# Patient Record
Sex: MALE | Race: WHITE | NOT HISPANIC OR LATINO | Employment: STUDENT | URBAN - METROPOLITAN AREA
[De-identification: names, ages, dates, MRNs, and addresses within clinical notes are randomized per-mention and may not be internally consistent; named-entity substitution may affect disease eponyms.]

---

## 2022-06-08 ENCOUNTER — OFFICE VISIT (OUTPATIENT)
Dept: URGENT CARE | Facility: URGENT CARE | Age: 27
End: 2022-06-08
Payer: COMMERCIAL

## 2022-06-08 VITALS
HEART RATE: 82 BPM | RESPIRATION RATE: 18 BRPM | OXYGEN SATURATION: 98 % | DIASTOLIC BLOOD PRESSURE: 80 MMHG | WEIGHT: 315 LBS | TEMPERATURE: 97.9 F | SYSTOLIC BLOOD PRESSURE: 120 MMHG

## 2022-06-08 DIAGNOSIS — R05.9 COUGH IN ADULT: ICD-10-CM

## 2022-06-08 DIAGNOSIS — J01.10 ACUTE NON-RECURRENT FRONTAL SINUSITIS: Primary | ICD-10-CM

## 2022-06-08 LAB — SARS-COV-2 RNA RESP QL NAA+PROBE: NEGATIVE

## 2022-06-08 PROCEDURE — 99203 OFFICE O/P NEW LOW 30 MIN: CPT | Performed by: FAMILY MEDICINE

## 2022-06-08 PROCEDURE — U0003 INFECTIOUS AGENT DETECTION BY NUCLEIC ACID (DNA OR RNA); SEVERE ACUTE RESPIRATORY SYNDROME CORONAVIRUS 2 (SARS-COV-2) (CORONAVIRUS DISEASE [COVID-19]), AMPLIFIED PROBE TECHNIQUE, MAKING USE OF HIGH THROUGHPUT TECHNOLOGIES AS DESCRIBED BY CMS-2020-01-R: HCPCS | Performed by: FAMILY MEDICINE

## 2022-06-08 PROCEDURE — U0005 INFEC AGEN DETEC AMPLI PROBE: HCPCS | Performed by: FAMILY MEDICINE

## 2022-06-08 RX ORDER — AMOXICILLIN 500 MG/1
1000 CAPSULE ORAL 2 TIMES DAILY
Qty: 40 CAPSULE | Refills: 0 | Status: SHIPPED | OUTPATIENT
Start: 2022-06-08 | End: 2022-06-18

## 2022-06-08 NOTE — PATIENT INSTRUCTIONS
Amoxicillin twice daily for 10 days.      Continue Tylenol or ibuprofen as needed for headache pain.    If not any better after 2 weeks, follow up with primary care.

## 2022-06-08 NOTE — PROGRESS NOTES
ICD-10-CM    1. Acute non-recurrent frontal sinusitis  J01.10 amoxicillin (AMOXIL) 500 MG capsule   2. Cough in adult  R05.9 Symptomatic; Yes; 5/25/2022 COVID-19 Virus (Coronavirus) by PCR Nose     PLAN:  Discussed with patient that length of symptoms is right on the cusp of when antibiotics may be helpful for a sinus infection but that this may still be viral, which antibiotics will not help.    Patient Instructions   Amoxicillin twice daily for 10 days.      Continue Tylenol or ibuprofen as needed for headache pain.    If not any better after 2 weeks, follow up with primary care.    SUBJECTIVE:  Edwin Alex is a 26 year old male who presents to  today with about 2 weeks of ongoing congestion and cough that seems to be worsening.  He's having more consistent frontal headaches as well.  No fever.  Has been using OTC analgesics sporadically to help with the headaches.  No sore throat.    Has septoplasty scheduled on 6/15.    OBJECTIVE:  /80 (BP Location: Right arm, Patient Position: Chair, Cuff Size: Adult Large)   Pulse 82   Temp 97.9  F (36.6  C) (Oral)   Resp 18   Wt (!) 156.5 kg (345 lb)   SpO2 98%   GEN: well-appearing, in NAD  ENT: TMs normal, oral MMM, R lower turbinate swollen, L turbinates appear normal, oral MMM, no pharyngeal erythema, no exudates  Neck: no LAD

## 2022-06-12 PROCEDURE — U0003 INFECTIOUS AGENT DETECTION BY NUCLEIC ACID (DNA OR RNA); SEVERE ACUTE RESPIRATORY SYNDROME CORONAVIRUS 2 (SARS-COV-2) (CORONAVIRUS DISEASE [COVID-19]), AMPLIFIED PROBE TECHNIQUE, MAKING USE OF HIGH THROUGHPUT TECHNOLOGIES AS DESCRIBED BY CMS-2020-01-R: HCPCS | Performed by: CLINICAL MEDICAL LABORATORY

## 2022-06-12 PROCEDURE — PSEU10635 2019 NOVEL CORONAVIRUS (SARS-COV-2): Performed by: CLINICAL MEDICAL LABORATORY

## 2022-06-12 PROCEDURE — U0005 INFEC AGEN DETEC AMPLI PROBE: HCPCS | Performed by: CLINICAL MEDICAL LABORATORY

## 2022-06-13 ENCOUNTER — LAB REQUISITION (OUTPATIENT)
Dept: LAB | Age: 27
End: 2022-06-13

## 2022-06-13 ENCOUNTER — TELEPHONE (OUTPATIENT)
Dept: GASTROENTEROLOGY | Facility: CLINIC | Age: 27
End: 2022-06-13
Payer: COMMERCIAL

## 2022-06-13 DIAGNOSIS — J34.2 DEVIATED NASAL SEPTUM: ICD-10-CM

## 2022-06-13 LAB
SARS-COV-2 RNA RESP QL NAA+PROBE: NOT DETECTED
SERVICE CMNT-IMP: NORMAL
SERVICE CMNT-IMP: NORMAL

## 2022-06-13 NOTE — TELEPHONE ENCOUNTER
LVM to reschdule patient with Dr. Rodrigez for an earlier appointment. Dr. Rodrigez would like to see him much sooner then 10/10. Please schedule early appts in held slots

## 2022-06-20 NOTE — TELEPHONE ENCOUNTER
REFERRAL INFORMATION:    Referring Provider: Self    Referring Clinic:    Reason for Visit/Diagnosis: difficulty swallowing     FUTURE VISIT INFORMATION:    Appointment Date: 7.5.22    Appointment Time: 8 AM     NOTES STATUS DETAILS   OFFICE NOTE from Referring Provider N/A    OFFICE NOTE from Other Specialist N/A    HOSPITAL DISCHARGE SUMMARY/  ED VISITS N/A    OPERATIVE REPORT N/A    MEDICATION LIST N/A         ENDOSCOPY  N/A    COLONOSCOPY N/A    ERCP N/A    EUS N/A    STOOL TESTING N/A    PERTINENT LABS N/A    PATHOLOGY REPORTS (RELATED) N/A    IMAGING (CT, MRI, EGD, MRCP, Small Bowel Follow Through/SBT, MR/CT Enterography) N/A

## 2022-07-05 ENCOUNTER — VIRTUAL VISIT (OUTPATIENT)
Dept: GASTROENTEROLOGY | Facility: CLINIC | Age: 27
End: 2022-07-05
Payer: COMMERCIAL

## 2022-07-05 ENCOUNTER — PRE VISIT (OUTPATIENT)
Dept: GASTROENTEROLOGY | Facility: CLINIC | Age: 27
End: 2022-07-05
Payer: COMMERCIAL

## 2022-07-05 VITALS — WEIGHT: 315 LBS

## 2022-07-05 DIAGNOSIS — R11.10 REGURGITATION OF FOOD: ICD-10-CM

## 2022-07-05 DIAGNOSIS — R07.89 NON-CARDIAC CHEST PAIN: ICD-10-CM

## 2022-07-05 DIAGNOSIS — R13.19 ESOPHAGEAL DYSPHAGIA: Primary | ICD-10-CM

## 2022-07-05 PROCEDURE — 99205 OFFICE O/P NEW HI 60 MIN: CPT | Mod: GT | Performed by: INTERNAL MEDICINE

## 2022-07-05 ASSESSMENT — PAIN SCALES - GENERAL: PAINLEVEL: NO PAIN (0)

## 2022-07-05 NOTE — PATIENT INSTRUCTIONS
It was a pleasure taking care of you today.  I've included a brief summary of our discussion and care plan from today's visit below.  Please review this information with your primary care provider.  _______________________________________________________________________    My recommendations are summarized as follows:    Please schedule an upper endoscopy to evaluate your symptoms.   Below are possible treatments for eosinophilic esophagitis if this is confirmed:  Eosinophilic Esophagitis Treatment options include:   - Proton Pump Inhibitors (PPI) twice daily  - Fluticasone 440 to 880mcg twice daily  - Budesonide 1 to 2mg twice daily mixed with (Each 1mg mixed with 10 packets of splenda to create a slurry)  - Six food elimination diet: Avoid wheat, milk, egg, tree nuts/peanuts, seafood, and soy  - Following swallowed steroid do not eat or drink anything for at least 30 minutes  - Dupilumab is an injectable drug that was recently approved for eosinophilic esophagitis      To schedule endoscopic procedures you may call: [phone]  To schedule radiology tests you may call: [phone]  To schedule an ENT appointment you may call: [phone]    Please call my nurse Flora ([phone]), Best ([phone]) with any questions or concerns.  If you were seen through the [facility] please feel free to reach out to Crystal at [phone]   --    Return to GI Clinic in 3 months to review your progress.    _______________________________________________________________________    Who do I call with any questions after my visit?  Please be in touch if there are any further questions that arise following today's visit.  There are multiple ways to contact your gastroenterology care team.      During business hours, you may reach a Gastroenterology nurse at [phone] and choose option 3.       To schedule or reschedule an appointment, please call [phone].     You can always send a secure message through  MyChart.  [employer] messages are answered by your nurse or doctor typically within 24 hours.  Please allow extra time on weekends and holidays.      For urgent/emergent questions after business hours, you may reach the on-call GI Fellow by contacting the [facility]  at [phone].     How will I get the results of any tests ordered?    You will receive all of your results.  If you have signed up for [employer], any tests ordered at your visit will be available to you after your physician reviews them.  Typically this takes 1-2 weeks.  If there are urgent results that require a change in your care plan, your physician or nurse will call you to discuss the next steps.      What is [employer]?  [employer] is a secure way for you to access all of your healthcare records from the [facility].  It is a web based computer program, so you can sign on to it from any location.  It also allows you to send secure messages to your care team.  I recommend signing up for [employer] access if you have not already done so and are comfortable with using a computer.      How to I schedule a follow-up visit?  If you did not schedule a follow-up visit today, please call [phone] to schedule a follow-up office visit.      If you feel you received exceptional care and are interested in supporting the clinical and research goals of Dr. Rodrigez or the Division of Gastroenterology, Hepatology, and Nutrition please contact [email or web address] from the [facility] to discuss opportunities to donate.    Sincerely,    Juan Pablo Rodrigez DO   of Medicine  Director, Esophageal Disorders Program  Division of Gastroenterology, Hepatology, and Nutrition  [facility]

## 2022-07-05 NOTE — LETTER
7/5/2022         RE: Edwin Alex  [address] [place] [address]        Dear Colleague,    Thank you for referring your patient, Edwin Alex, to the [facility] [place]. Please see a copy of my visit note below.    Edwin is a 26 year old who is being evaluated via a billable video visit.     Patient denies any changes since echeck-in regarding medication and allergies and states all information entered during echeck-in remains accurate.     How would you like to obtain your AVS? MyChart  If the video visit is dropped, the invitation should be resent by: Send to e-mail at: [email or web address]  Will anyone else be joining your video visit? No       Yaritza Marcelino      Video-Visit Details    Video Start Time: 7:55AM    Type of service:  Video Visit    Video End Time: 8:42 AM    Originating Location (pt. Location): home    Distant Location (provider location):  [facility] [place]     Platform used for Video Visit: Comply7    Gastroenterology Visit for: Edwin Alex 1995   MRN: [id number]     Reason for Visit:  Dysphagia     Referred by: Self   Patient Care Team:  No Ref-Primary, Physician as PCP - Juan Pablo Johns DO as MD (Gastroenterology)    History of Present Illness:   Edwin Alex is a 26 year old male with prior history of chronic sinusitis and deviated septum s/p septoplasty 6/15/22 who is presenting as a new patient in consultation at the request of Dr. Maylin Mcintyre from [facility] in [place] with a chief complaint of reflux and dysphagia with regurgitation.  ---------------------------------------------------------------  Edwin Alex states ongoing issues with dysphagia for the past couple of years with progression of symptoms over the last year. Dysphagia is limited to solid foods such as meats and bread where he feels as though it gets stuck in his chest. Typically lasts 5-7 minutes before  resolving. Occasionally associated with retrosternal pain. Will sometimes attempt to drink fluid to wash it down which can help but has lead to regurgitation with choking and coughing as well. Denies any issues with liquids or soft foods on their own. He also notes a long standing history of acid reflux and occasionally experiences nasal regurgitation. He takes TUMS which helps somewhat but has never tried PPI therapy. Denies any weight loss, voice changes, odynophagia, nausea, vomiting, early satiety, abdominal pain, abdominal distension/bloating, diarrhea, constipation, hematochezia, or melena. No history of radiation exposure. No family history of EOE, esophageal cancer, autoimmune diseases. Denies any history of seasonal allergies or food allergies. Has never had EGD, esophagram or manometry testing previously. Patient does not smoke. He drinks 2-3 alcoholic beverages per week. Uses Zyn pouches but no chewing tobacco products.    ---------------------------------------------------------------      Family history of colon cancer: none  Wt Readings from Last 5 Encounters:   07/05/22 (!) 156.5 kg (345 lb)   06/08/22 (!) 156.5 kg (345 lb)        Esophageal Questionnaire(s)    BEDQ Questionnaire  BEDQ Questionnaire: How Often Have You Had the Following? 7/5/2022   Trouble eating solid food (meat, bread, vegetables) 4   Trouble eating soft foods (yogurt, jello, pudding) 0   Trouble swallowing liquids 1   Pain while swallowing 3   Coughing or choking while swallowing foods or liquids 1   Total Score: 9     BEDQ Questionnaire: Discomfort/Pain Ratings 7/5/2022   Eating solid food (meat, bread, vegetables) 4   Eating soft foods (yogurt, jello, pudding) 1   Drinking liquid 0   Total Score: 5       Eckardt Questionnaire  Eckardt Questionnaire 7/5/2022   Dysphagia 2   Regurgitation 1   Retrosternal Pain 2   Weight Loss (kg) 0   Total Score:  5       Promis 10 Questionnaire  No flowsheet data found.      STUDIES &  PROCEDURES:    EGD:   Date:  Impression:  Pathology Report:    Colonoscopy:  Date:  Impression:  Pathology Report:     EndoFLIP directed at the UES or LES (8cm (EF-325) balloon length or 16cm (EF-322) balloon length):   Date:  8cm balloon  Balloon inflation Balloon pressure CSA (mm^2) DI (mm^2/mmHg) Dmin (mm) Compliance   20 (ladmark ID)        30        40        50           16cm balloon  Balloon inflation Balloon pressure CSA (mm^2) DI (mm^2/mmHg) Dmin (mm) Compliance   30 (ladmark ID)        40        50        60        70           High Resolution Manometry:  Date:  Impression:    PH/Impedance:  Date:  Impression:     Bravo:  48 or 96hr  Date:  Impression:    CT:  Date:  Impression:    Esophagram:  Date:  Impression:     Prior medical records were reviewed including, but not limited to, notes from referring providers, lab work, radiographic tests, and other diagnostic tests. Pertinent results were summarized above.     History   No past medical history on file.    No past surgical history on file.    Social History     Socioeconomic History     Marital status: Single     Spouse name: Not on file     Number of children: Not on file     Years of education: Not on file     Highest education level: Not on file   Occupational History     Not on file   Tobacco Use     Smoking status: Never Smoker     Smokeless tobacco: Current User     Types: Chew   Substance and Sexual Activity     Alcohol use: Yes     Drug use: Never     Sexual activity: Not Currently   Other Topics Concern     Not on file   Social History Narrative     Not on file     Social Determinants of Health     Financial Resource Strain: Not on file   Food Insecurity: Not on file   Transportation Needs: Not on file   Physical Activity: Not on file   Stress: Not on file   Social Connections: Not on file   Intimate Partner Violence: Not on file   Housing Stability: Not on file     Patient does not smoke. He drinks 2-3 alcoholic beverages per week. Uses Zyn  pouches but no tobacco products. He just finished a masters program in sports management at [employer] and is currently looking for jobs.     No family history on file.  Family history reviewed and edited as appropriate    Medications and Allergies:     No outpatient encounter medications on file as of 7/5/2022.     No facility-administered encounter medications on file as of 7/5/2022.        No Known Allergies     Review of systems:  A full 10 point review of systems was obtained and was negative except for the pertinent positives and negatives stated within the HPI.    Objective Findings:   Physical Exam:    Constitutional: Wt (!) 156.5 kg (345 lb)   General: Alert, cooperative, no distress, well-appearing  Head: Atraumatic, normocephalic, no obvious abnormalities   Eyes: EOMI, Sclera anicteric, no obvious conjunctival hemorrhage   Nose:  no obvious malformation, no obvious rhinorrhea   Respiratory: breathing comfortably on room air, not using accessory muscles   Cardiovascular: deferred   Gastrointestinal:  non-distended abdomen   Musculoskeletal: Range of motion intact, no obvious strength deficit  Skin: No jaundice, no obvious rash  Neurologic: AAOx3, no obvious neurologic abnormality  Psychiatric: Normal Affect, appropriate mood  Extremities: not assessed      Labs, Radiology, Pathology     No results found for: WBC, HGB, PLT, CHOL, TRIG, HDL, LDLDIRECT, ALT, AST, NA, CREATININE, BUN, CO2, TSH, INR, GLUF     Liver Function Studies - No results for input(s): PROTTOTAL, ALBUMIN, BILITOTAL, ALKPHOS, AST, ALT, BILIDIRECT in the last 29014 hours.       Patient Active Problem List    Diagnosis Date Noted     Esophageal dysphagia 07/05/2022     Priority: Medium     Regurgitation of food 07/05/2022     Priority: Medium     Non-cardiac chest pain 07/05/2022     Priority: Medium      Assessment and Plan   Assessment:    Edwin Alex is a 26 year old male with prior history of chronic sinusitis and deviated septum s/p  septoplasty 6/15/22 who is presenting as a new patient with a chief complaint of reflux and dysphagia with regurgitation.    Reports progressive, what primarily sounds like esophageal dysphagia with associated acid reflux and intermittent regurgitation for the last two years with worsening over the last year. No associated red flag symptoms. No prior investigation such as EGD, esophagram or manometry. Ddx includes reflux esophagitis w or w/o stricturing in the setting of untreated GERD vs EOE in this young, otherwise healthy male patient. Cannot entirely rule out other etiologies such as hiatal hernia, schatzki ring among others at this time. Malignancy low on differential given age, duration of symptoms, no family history. Will evaluate further with EGD and biopsies. Therapy recommendations pending EGD evaluation.    Plan:  - Order placed for endoscopic evaluation with possible balloon dilation if needed   - If confirmed EOE, would proceed with PPI BID vs swallowed steroidal therapy (Budesonide or Fluticasone) vs Dupixent and/or elimination diet   - Discussed lifestyle modifications for GERD; weight loss, low acid diet, meal time planning   - F/U in 3 months       Follow up plan:   Return to clinic 3 months and as needed.    The risks and benefits of my recommendations, as well as other treatment options were discussed with the patient and any available family today. All questions were answered.     o Follow up: As planned above. Today, I personally spent 40 minutes in direct face to face time with the patient, of which greater than 50% of the time was spent in patient education and counseling as described above. Approximately 15 minutes were spent on indirect care associated with the patient's consultation including but not limited to review of: patient medical records to date, clinic visits, hospital records, lab results, imaging studies, procedural documentation, and coordinating care with other providers. The  findings from this review are summarized in the above note. All of the above accounted for a cumulative time of 55 minutes and was performed on the date of service.     The patient verbalized understanding of the plan and was appreciative for the time spent and information provided during the office visit.     Pramod Lozoya MD  Internal Medicine PGY3    Discussed with staff Dr Elia MD       Documentation assisted by voice recognition and documentation system.    Attending Attestation:  I saw the patient with Dr. Lozoya and agree with the findings and the plan of care as documented in the resident's note. In summary, the patient is an 26 year old male with a history of dysphagia. We have been consulted to assess the patient's dysphagia and regurgitation     The patient was seen in video telemedicine consultation regarding symptoms of dysphagia and regurgitation as well as retrosternal pain.  Given his age and symptoms the highest diagnosis on my differential is eosinophilic esophagitis.  That being said, he may have some component of reflux disease.  It is certainly possible that he has extensive erosive esophagitis and/or a peptic stricture that is contributing to his symptoms.  It seems less likely that he has a motility abnormality given the absence of dysphagia to liquids however this is a possibility as well.  Least likely is malignancy given his age and the duration of symptoms.    In order to evaluate his symptoms I have asked that he undergo upper endoscopy with biopsies for eosinophilic esophagitis.  He may require dilation at that time.  I have ordered to endoscopies to be performed approximately 2 to 4 weeks apart.  If he does not require dilation the second endoscopy can be canceled.    I discussed the pathophysiology of eosinophilic esophagitis as well as treatment options for the disorder.  Future therapy directions will be based on the upper endoscopy.  Overall time spent discussing, thinking,  reviewing the chart including available imaging and labs, and evaluating the patient was 55 minutes of which greater than 50% of the time was spent on counseling and coordination of care.    Juan Pablo Rodrigez DO   of Medicine  Director, Esophageal Disorders Program  Division of Gastroenterology, Hepatology, and Nutrition  [facility]          Again, thank you for allowing me to participate in the care of your patient.        Sincerely,        Juan Pablo Rodrigez DO

## 2022-07-05 NOTE — PROGRESS NOTES
Edwin is a 26 year old who is being evaluated via a billable video visit.     Patient denies any changes since echeck-in regarding medication and allergies and states all information entered during echeck-in remains accurate.     How would you like to obtain your AVS? [email or web address]  If the video visit is dropped, the invitation should be resent by: Send to e-mail at: [email or web address]  Will anyone else be joining your video visit? No       Yaritza Owenseldon      Video-Visit Details    Video Start Time: 7:55AM    Type of service:  Video Visit    Video End Time: 8:42 AM    Originating Location (pt. Location): home    Distant Location (provider location):  [facility] [place]     Platform used for Video Visit: [employer]    Gastroenterology Visit for: Edwin Alex 1995   MRN: [id number]     Reason for Visit:  Dysphagia     Referred by: Self   Patient Care Team:  No Ref-Primary, Physician as PCP - Juan Pablo Johns DO as MD (Gastroenterology)    History of Present Illness:   Edwin Alex is a 26 year old male with prior history of chronic sinusitis and deviated septum s/p septoplasty 6/15/22 who is presenting as a new patient in consultation at the request of Dr. Maylin Mcintyre from [facility] in [place] with a chief complaint of reflux and dysphagia with regurgitation.  ---------------------------------------------------------------  Edwin Alex states ongoing issues with dysphagia for the past couple of years with progression of symptoms over the last year. Dysphagia is limited to solid foods such as meats and bread where he feels as though it gets stuck in his chest. Typically lasts 5-7 minutes before resolving. Occasionally associated with retrosternal pain. Will sometimes attempt to drink fluid to wash it down which can help but has lead to regurgitation with choking and coughing as well. Denies any issues with liquids or soft foods on their own. He also notes a  long standing history of acid reflux and occasionally experiences nasal regurgitation. He takes TUMS which helps somewhat but has never tried PPI therapy. Denies any weight loss, voice changes, odynophagia, nausea, vomiting, early satiety, abdominal pain, abdominal distension/bloating, diarrhea, constipation, hematochezia, or melena. No history of radiation exposure. No family history of EOE, esophageal cancer, autoimmune diseases. Denies any history of seasonal allergies or food allergies. Has never had EGD, esophagram or manometry testing previously. Patient does not smoke. He drinks 2-3 alcoholic beverages per week. Uses Zyn pouches but no chewing tobacco products.    ---------------------------------------------------------------      Family history of colon cancer: none  Wt Readings from Last 5 Encounters:   07/05/22 (!) 156.5 kg (345 lb)   06/08/22 (!) 156.5 kg (345 lb)        Esophageal Questionnaire(s)    BEDQ Questionnaire  BEDQ Questionnaire: How Often Have You Had the Following? 7/5/2022   Trouble eating solid food (meat, bread, vegetables) 4   Trouble eating soft foods (yogurt, jello, pudding) 0   Trouble swallowing liquids 1   Pain while swallowing 3   Coughing or choking while swallowing foods or liquids 1   Total Score: 9     BEDQ Questionnaire: Discomfort/Pain Ratings 7/5/2022   Eating solid food (meat, bread, vegetables) 4   Eating soft foods (yogurt, jello, pudding) 1   Drinking liquid 0   Total Score: 5       Eckardt Questionnaire  Eckardt Questionnaire 7/5/2022   Dysphagia 2   Regurgitation 1   Retrosternal Pain 2   Weight Loss (kg) 0   Total Score:  5       Promis 10 Questionnaire  No flowsheet data found.      STUDIES & PROCEDURES:    EGD:   Date:  Impression:  Pathology Report:    Colonoscopy:  Date:  Impression:  Pathology Report:     EndoFLIP directed at the UES or LES (8cm (EF-325) balloon length or 16cm (EF-322) balloon length):   Date:  8cm balloon  Balloon inflation Balloon pressure CSA  (mm^2) DI (mm^2/mmHg) Dmin (mm) Compliance   20 (ladmark ID)        30        40        50           16cm balloon  Balloon inflation Balloon pressure CSA (mm^2) DI (mm^2/mmHg) Dmin (mm) Compliance   30 (ladmark ID)        40        50        60        70           High Resolution Manometry:  Date:  Impression:    PH/Impedance:  Date:  Impression:     Bravo:  48 or 96hr  Date:  Impression:    CT:  Date:  Impression:    Esophagram:  Date:  Impression:     Prior medical records were reviewed including, but not limited to, notes from referring providers, lab work, radiographic tests, and other diagnostic tests. Pertinent results were summarized above.     History   No past medical history on file.    No past surgical history on file.    Social History     Socioeconomic History     Marital status: Single     Spouse name: Not on file     Number of children: Not on file     Years of education: Not on file     Highest education level: Not on file   Occupational History     Not on file   Tobacco Use     Smoking status: Never Smoker     Smokeless tobacco: Current User     Types: Chew   Substance and Sexual Activity     Alcohol use: Yes     Drug use: Never     Sexual activity: Not Currently   Other Topics Concern     Not on file   Social History Narrative     Not on file     Social Determinants of Health     Financial Resource Strain: Not on file   Food Insecurity: Not on file   Transportation Needs: Not on file   Physical Activity: Not on file   Stress: Not on file   Social Connections: Not on file   Intimate Partner Violence: Not on file   Housing Stability: Not on file     Patient does not smoke. He drinks 2-3 alcoholic beverages per week. Uses Zyn pouches but no tobacco products. He just finished a masters program in sports management at [employer] and is currently looking for jobs.     No family history on file.  Family history reviewed and edited as appropriate    Medications and Allergies:     No outpatient encounter  medications on file as of 7/5/2022.     No facility-administered encounter medications on file as of 7/5/2022.        No Known Allergies     Review of systems:  A full 10 point review of systems was obtained and was negative except for the pertinent positives and negatives stated within the HPI.    Objective Findings:   Physical Exam:    Constitutional: Wt (!) 156.5 kg (345 lb)   General: Alert, cooperative, no distress, well-appearing  Head: Atraumatic, normocephalic, no obvious abnormalities   Eyes: EOMI, Sclera anicteric, no obvious conjunctival hemorrhage   Nose:  no obvious malformation, no obvious rhinorrhea   Respiratory: breathing comfortably on room air, not using accessory muscles   Cardiovascular: deferred   Gastrointestinal:  non-distended abdomen   Musculoskeletal: Range of motion intact, no obvious strength deficit  Skin: No jaundice, no obvious rash  Neurologic: AAOx3, no obvious neurologic abnormality  Psychiatric: Normal Affect, appropriate mood  Extremities: not assessed      Labs, Radiology, Pathology     No results found for: WBC, HGB, PLT, CHOL, TRIG, HDL, LDLDIRECT, ALT, AST, NA, CREATININE, BUN, CO2, TSH, INR, GLUF     Liver Function Studies - No results for input(s): PROTTOTAL, ALBUMIN, BILITOTAL, ALKPHOS, AST, ALT, BILIDIRECT in the last [id number] hours.       Patient Active Problem List    Diagnosis Date Noted     Esophageal dysphagia 07/05/2022     Priority: Medium     Regurgitation of food 07/05/2022     Priority: Medium     Non-cardiac chest pain 07/05/2022     Priority: Medium      Assessment and Plan   Assessment:    Edwin Alex is a 26 year old male with prior history of chronic sinusitis and deviated septum s/p septoplasty 6/15/22 who is presenting as a new patient with a chief complaint of reflux and dysphagia with regurgitation.    Reports progressive, what primarily sounds like esophageal dysphagia with associated acid reflux and intermittent regurgitation for the last two years  with worsening over the last year. No associated red flag symptoms. No prior investigation such as EGD, esophagram or manometry. Ddx includes reflux esophagitis w or w/o stricturing in the setting of untreated GERD vs EOE in this young, otherwise healthy male patient. Cannot entirely rule out other etiologies such as hiatal hernia, schatzki ring among others at this time. Malignancy low on differential given age, duration of symptoms, no family history. Will evaluate further with EGD and biopsies. Therapy recommendations pending EGD evaluation.    Plan:  - Order placed for endoscopic evaluation with possible balloon dilation if needed   - If confirmed EOE, would proceed with PPI BID vs swallowed steroidal therapy (Budesonide or Fluticasone) vs Dupixent and/or elimination diet   - Discussed lifestyle modifications for GERD; weight loss, low acid diet, meal time planning   - F/U in 3 months       Follow up plan:   Return to clinic 3 months and as needed.    The risks and benefits of my recommendations, as well as other treatment options were discussed with the patient and any available family today. All questions were answered.     o Follow up: As planned above. Today, I personally spent 40 minutes in direct face to face time with the patient, of which greater than 50% of the time was spent in patient education and counseling as described above. Approximately 15 minutes were spent on indirect care associated with the patient's consultation including but not limited to review of: patient medical records to date, clinic visits, hospital records, lab results, imaging studies, procedural documentation, and coordinating care with other providers. The findings from this review are summarized in the above note. All of the above accounted for a cumulative time of 55 minutes and was performed on the date of service.     The patient verbalized understanding of the plan and was appreciative for the time spent and information  provided during the office visit.     Pramod Lozoya MD  Internal Medicine PGY3    Discussed with staff Dr Elia MD       Documentation assisted by voice recognition and documentation system.    Attending Attestation:  I saw the patient with Dr. Lozoya and agree with the findings and the plan of care as documented in the resident's note. In summary, the patient is an 26 year old male with a history of dysphagia. We have been consulted to assess the patient's dysphagia and regurgitation     The patient was seen in video telemedicine consultation regarding symptoms of dysphagia and regurgitation as well as retrosternal pain.  Given his age and symptoms the highest diagnosis on my differential is eosinophilic esophagitis.  That being said, he may have some component of reflux disease.  It is certainly possible that he has extensive erosive esophagitis and/or a peptic stricture that is contributing to his symptoms.  It seems less likely that he has a motility abnormality given the absence of dysphagia to liquids however this is a possibility as well.  Least likely is malignancy given his age and the duration of symptoms.    In order to evaluate his symptoms I have asked that he undergo upper endoscopy with biopsies for eosinophilic esophagitis.  He may require dilation at that time.  I have ordered to endoscopies to be performed approximately 2 to 4 weeks apart.  If he does not require dilation the second endoscopy can be canceled.    I discussed the pathophysiology of eosinophilic esophagitis as well as treatment options for the disorder.  Future therapy directions will be based on the upper endoscopy.  Overall time spent discussing, thinking, reviewing the chart including available imaging and labs, and evaluating the patient was 55 minutes of which greater than 50% of the time was spent on counseling and coordination of care.    Juan Pablo Rodrigez DO   of Medicine  Director, Esophageal Disorders  Program  Division of Gastroenterology, Hepatology, and Nutrition  [facility]

## 2022-07-05 NOTE — LETTER
Date:July 6, 2022      Provider requested that no letter be sent. Do not send.       [facility]

## 2022-07-07 ENCOUNTER — TELEPHONE (OUTPATIENT)
Dept: GASTROENTEROLOGY | Facility: CLINIC | Age: 27
End: 2022-07-07

## 2022-07-07 NOTE — TELEPHONE ENCOUNTER
Screening Questions    BlueKIND OF PREP RedLOCATION [review exclusion criteria] GreenSEDATION TYPE      1. Are you active on mychart? Yes    2. What insurance is in the chart? [facility]     3.   Ordering/Referring Provider: Elia    4. BMI   (If greater than 40 review exclusion criteria [PAC APPT IF [MAC] @ [facility])  42.0  [If yes, BMI OVER 40-EXTENDED PREP]      **(Sedation review/consideration needed)**  Do you have a legal guardian or Medical Power of    and/or are you able to give consent for your medical care?     Yes    5. Have you had a positive covid test in the last 90 days?   N - NA    6.  Are you currently on dialysis?   N [ If yes, G-PREP & HOSPITAL setting ONLY]     7.  Do you have chronic kidney disease?  N [ If yes, G-PREP ]    8.   Do you have a diagnosis of diabetes?   N   [ If yes, G-PREP ]    9.  On a regular basis do you go 3-5 days between bowel movements?   NA   [ If yes, EXTENDED PREP]    10.  Are you taking any prescription pain medications on a routine schedule?    N - NA [ If yes, EXTENDED PREP] [If yes, MAC]      11.   Do you have any chemical dependencies such as alcohol, street drugs, or methadone?    N [If yes, MAC]    12.   Do you have any history of post-traumatic stress syndrome, severe anxiety or history of psychosis?    N  [If yes, MAC]    13.  [FEMALES] Are you currently pregnant? NA    If yes, how many weeks?       Respiratory/Heart Screening:  [If yes to any of the following HOSPITAL setting only]     14. Do you have Pulmonary Hypertension [Lungs]?   N       15. Do you have UNCONTROLLED asthma?   N     16.  Do you use daily home oxygen?  N      17. Do you have mod to severe Obstructive Sleep Apnea?         (OKAY @ [facility]  [facility]  SH  PH  RI  MG - if pt is not on OXYGEN)  N      18.   Have you had a heart or lung transplant?   N      19.   Have you had a stroke or Transient ischemic attack (TIA - aka  mini stroke ) within 6 months?  (If yes, please review exclusion criteria)  N      20.   In the past 6 months, have you had any heart related issues including cardiomyopathy or heart attack?   N           If yes, did it require cardiac stenting or other implantable device?   N      21.   Do you have any implantable devices in your body (pacemaker, defib, LVAD)? (If yes, please review exclusion criteria)  N     22. Do you take nitroglycerin?   N           If yes, how often? NA  (if yes, HOSPITAL setting ONLY)    23.  Are you currently taking any blood thinners?    [If yes, INFORM patient to follw up w/ ORDERING PROVIDER FOR BRIDGING INSTRUCTIONS]     N    24.   Do you transfer independently?                (If NO, please HOSPITAL setting ONLY)  Yes    25.   Preferred LOCAL Pharmacy for Pre Prescription:           [id number] [place] [address]    Scheduling Details  (Please ask for phone number if not scheduled by patient)      Caller : Edwin  Date of Procedure: 8/4/22 and 9/22/22  Surgeon: Elia  [place] [facility] [place] Type: MAC l   Conscious Sedation- Needs  for 6 hours after the procedure  MAC/General-Needs  for 24 hours after procedure    NA :[Pre-op Required] at U  SH  MG and OR for MAC sedation   (advise patient they will need a pre-op WITH IN 30 DAYS of procedure date)     Type of Procedure Scheduled:   Upper Endoscopy [EGD]    Which Colonoscopy Prep was Sent?:   TIANNA -     ROSALIND CF PATIENTS & GROEN'S PATIENTS NEEDS EXTENDED PREP       Informed patient they will need an adult  Yes  Cannot take any type of public or medical transportation alone    Pre-Procedure Covid test to be completed at [facility] or Externally: HOME  **INFORMED OF HOME TESTING & LAB OPTION**        Confirmed Nurse will call to complete assessment Yes    Additional comments:

## 2022-07-24 ENCOUNTER — HEALTH MAINTENANCE LETTER (OUTPATIENT)
Age: 27
End: 2022-07-24

## 2022-08-01 ENCOUNTER — TELEPHONE (OUTPATIENT)
Dept: GASTROENTEROLOGY | Facility: CLINIC | Age: 27
End: 2022-08-01

## 2022-08-01 NOTE — TELEPHONE ENCOUNTER
Attempted to contact patient regarding upcoming EGD procedure on 8.4.22 for pre assessment questions. No answer.     Left message to return call to [phone] #3    Gila Schofield RN

## 2022-08-01 NOTE — TELEPHONE ENCOUNTER
Patient scheduled for EGD on 8.4.22.     Covid test scheduled ?. Discuss at home test option.     Arrival time: 0950    [place] [facility] [place]edation type: MAC    Indication for procedure: Dysphagia, possible EoE    Anticoagulations? No     Pre visit planning completed.    Gila Schofield RN

## 2022-08-01 NOTE — TELEPHONE ENCOUNTER
Patient returned call.     Pre assessment questions completed for upcoming EGD procedure scheduled on 8/4/22    COVID policy reviewed. Patient to complete rapid antigen test one to two days before their scheduled procedure. Patient to bring photo of the results when they come in for their procedure.    Reviewed procedural arrival time 0950 and facility location [facility].    Designated  policy reviewed. Instructed to have someone stay 24 hours post procedure.     Reviewed EGD prep instructions with patient. NPO six hours prior to procedure.     Patient verbalized understanding and had no questions or concerns at this time.    Marcy Hinton RN

## 2022-08-04 ENCOUNTER — ANESTHESIA EVENT (OUTPATIENT)
Dept: SURGERY | Facility: AMBULATORY SURGERY CENTER | Age: 27
End: 2022-08-04
Payer: COMMERCIAL

## 2022-08-04 ENCOUNTER — HOSPITAL ENCOUNTER (OUTPATIENT)
Facility: AMBULATORY SURGERY CENTER | Age: 27
Discharge: HOME | End: 2022-08-04
Attending: INTERNAL MEDICINE
Payer: COMMERCIAL

## 2022-08-04 ENCOUNTER — ANESTHESIA (OUTPATIENT)
Dept: SURGERY | Facility: AMBULATORY SURGERY CENTER | Age: 27
End: 2022-08-04
Payer: COMMERCIAL

## 2022-08-04 VITALS
HEART RATE: 60 BPM | RESPIRATION RATE: 16 BRPM | DIASTOLIC BLOOD PRESSURE: 67 MMHG | HEIGHT: 76 IN | WEIGHT: 315 LBS | TEMPERATURE: 97 F | OXYGEN SATURATION: 97 % | BODY MASS INDEX: 38.36 KG/M2 | SYSTOLIC BLOOD PRESSURE: 123 MMHG

## 2022-08-04 VITALS — HEART RATE: 80 BPM

## 2022-08-04 DIAGNOSIS — R13.19 ESOPHAGEAL DYSPHAGIA: Primary | ICD-10-CM

## 2022-08-04 DIAGNOSIS — K20.80 LOS ANGELES GRADE D ESOPHAGITIS: ICD-10-CM

## 2022-08-04 LAB — UPPER GI ENDOSCOPY: NORMAL

## 2022-08-04 PROCEDURE — 88305 TISSUE EXAM BY PATHOLOGIST: CPT | Mod: TC | Performed by: INTERNAL MEDICINE

## 2022-08-04 PROCEDURE — 43239 EGD BIOPSY SINGLE/MULTIPLE: CPT

## 2022-08-04 RX ORDER — FLUMAZENIL 0.1 MG/ML
0.2 INJECTION, SOLUTION INTRAVENOUS
Status: ACTIVE | OUTPATIENT
Start: 2022-08-04 | End: 2022-08-04

## 2022-08-04 RX ORDER — ONDANSETRON 2 MG/ML
4 INJECTION INTRAMUSCULAR; INTRAVENOUS
Status: DISCONTINUED | OUTPATIENT
Start: 2022-08-04 | End: 2022-08-04 | Stop reason: HOSPADM

## 2022-08-04 RX ORDER — ONDANSETRON 2 MG/ML
4 INJECTION INTRAMUSCULAR; INTRAVENOUS EVERY 6 HOURS PRN
Status: DISCONTINUED | OUTPATIENT
Start: 2022-08-04 | End: 2022-08-05 | Stop reason: HOSPADM

## 2022-08-04 RX ORDER — SODIUM CHLORIDE, SODIUM LACTATE, POTASSIUM CHLORIDE, CALCIUM CHLORIDE 600; 310; 30; 20 MG/100ML; MG/100ML; MG/100ML; MG/100ML
INJECTION, SOLUTION INTRAVENOUS CONTINUOUS PRN
Status: DISCONTINUED | OUTPATIENT
Start: 2022-08-04 | End: 2022-08-04

## 2022-08-04 RX ORDER — PROPOFOL 10 MG/ML
INJECTION, EMULSION INTRAVENOUS CONTINUOUS PRN
Status: DISCONTINUED | OUTPATIENT
Start: 2022-08-04 | End: 2022-08-04

## 2022-08-04 RX ORDER — ONDANSETRON 4 MG/1
4 TABLET, ORALLY DISINTEGRATING ORAL EVERY 6 HOURS PRN
Status: DISCONTINUED | OUTPATIENT
Start: 2022-08-04 | End: 2022-08-05 | Stop reason: HOSPADM

## 2022-08-04 RX ORDER — NALOXONE HYDROCHLORIDE 0.4 MG/ML
0.4 INJECTION, SOLUTION INTRAMUSCULAR; INTRAVENOUS; SUBCUTANEOUS
Status: DISCONTINUED | OUTPATIENT
Start: 2022-08-04 | End: 2022-08-05 | Stop reason: HOSPADM

## 2022-08-04 RX ORDER — NALOXONE HYDROCHLORIDE 0.4 MG/ML
0.2 INJECTION, SOLUTION INTRAMUSCULAR; INTRAVENOUS; SUBCUTANEOUS
Status: DISCONTINUED | OUTPATIENT
Start: 2022-08-04 | End: 2022-08-05 | Stop reason: HOSPADM

## 2022-08-04 RX ORDER — LIDOCAINE HYDROCHLORIDE 20 MG/ML
INJECTION, SOLUTION INFILTRATION; PERINEURAL PRN
Status: DISCONTINUED | OUTPATIENT
Start: 2022-08-04 | End: 2022-08-04

## 2022-08-04 RX ORDER — PROCHLORPERAZINE MALEATE 10 MG
10 TABLET ORAL EVERY 6 HOURS PRN
Status: DISCONTINUED | OUTPATIENT
Start: 2022-08-04 | End: 2022-08-05 | Stop reason: HOSPADM

## 2022-08-04 RX ORDER — OMEPRAZOLE 40 MG/1
40 CAPSULE, DELAYED RELEASE ORAL DAILY
Qty: 90 CAPSULE | Refills: 3 | Status: SHIPPED | OUTPATIENT
Start: 2022-08-04 | End: 2023-08-04

## 2022-08-04 RX ORDER — PROPOFOL 10 MG/ML
INJECTION, EMULSION INTRAVENOUS PRN
Status: DISCONTINUED | OUTPATIENT
Start: 2022-08-04 | End: 2022-08-04

## 2022-08-04 RX ORDER — LIDOCAINE 40 MG/G
CREAM TOPICAL
Status: DISCONTINUED | OUTPATIENT
Start: 2022-08-04 | End: 2022-08-04 | Stop reason: HOSPADM

## 2022-08-04 RX ADMIN — LIDOCAINE HYDROCHLORIDE 100 MG: 20 INJECTION, SOLUTION INFILTRATION; PERINEURAL at 11:17

## 2022-08-04 RX ADMIN — SODIUM CHLORIDE, SODIUM LACTATE, POTASSIUM CHLORIDE, CALCIUM CHLORIDE: 600; 310; 30; 20 INJECTION, SOLUTION INTRAVENOUS at 11:14

## 2022-08-04 RX ADMIN — PROPOFOL 150 MCG/KG/MIN: 10 INJECTION, EMULSION INTRAVENOUS at 11:17

## 2022-08-04 RX ADMIN — PROPOFOL 80 MG: 10 INJECTION, EMULSION INTRAVENOUS at 11:20

## 2022-08-04 NOTE — ANESTHESIA CARE TRANSFER NOTE
Patient: Edwin Alex    Procedure: Procedure(s):  ESOPHAGOGASTRODUODENOSCOPY, WITH BIOPSY       Diagnosis: Esophageal dysphagia [R13.19]  Diagnosis Additional Information: No value filed.    Anesthesia Type:   No value filed.     Note:    Oropharynx: spontaneously breathing  Level of Consciousness: awake  Oxygen Supplementation: room air    Independent Airway: airway patency satisfactory and stable  Dentition: dentition unchanged  Vital Signs Stable: post-procedure vital signs reviewed and stable  Report to RN Given: handoff report given  Patient transferred to: Phase II    Handoff Report: Identifed the Patient, Identified the Reponsible Provider, Reviewed the pertinent medical history, Discussed the surgical course, Reviewed Intra-OP anesthesia mangement and issues during anesthesia, Set expectations for post-procedure period and Allowed opportunity for questions and acknowledgement of understanding      Vitals:  Vitals Value Taken Time   /67 08/04/22 1150   Temp 36.1  C (97  F) 08/04/22 1150   Pulse 60 08/04/22 1150   Resp 16 08/04/22 1150   SpO2 97 % 08/04/22 1150       Electronically Signed By: MARLENE Davis CRNA  August 4, 2022  12:37 PM

## 2022-08-04 NOTE — H&P
"Edwin Alex  [id number]  male  26 year old      Reason for procedure/surgery: egd, dysphagia    Patient Active Problem List   Diagnosis     Esophageal dysphagia     Regurgitation of food     Non-cardiac chest pain       Past Surgical History:  History reviewed. No pertinent surgical history.    Past Medical History: No past medical history on file.    Social History:   Social History     Tobacco Use     Smoking status: Never Smoker     Smokeless tobacco: Current User     Types: Chew   Substance Use Topics     Alcohol use: Yes       Family History: History reviewed. No pertinent family history.    Allergies: No Known Allergies    Active Medications:   No current outpatient medications on file.       Systemic Review:   CONSTITUTIONAL: NEGATIVE for fever, chills, change in weight  ENT/MOUTH: NEGATIVE for ear, mouth and throat problems  RESP: NEGATIVE for significant cough or SOB  CV: NEGATIVE for chest pain, palpitations or peripheral edema    Physical Examination:   Vital Signs: BP (!) 137/90 (BP Location: Right arm)   Pulse 60   Temp 97.2  F (36.2  C) (Temporal)   Resp 18   Ht 1.93 m (6' 4\")   Wt (!) 156.5 kg (345 lb)   SpO2 97%   BMI 41.99 kg/m    GENERAL: healthy, alert and no distress  NECK: no adenopathy, no asymmetry, masses, or scars  RESP: lungs clear to auscultation - no rales, rhonchi or wheezes  CV: regular rate and rhythm, normal S1 S2, no S3 or S4, no murmur, click or rub, no peripheral edema and peripheral pulses strong  ABDOMEN: soft, nontender, no hepatosplenomegaly, no masses and bowel sounds normal  MS: no gross musculoskeletal defects noted, no edema    Plan: Appropriate to proceed as scheduled.      Juan Pablo Rodrigez DO  8/4/2022    PCP:  No Ref-Primary, Physician    "

## 2022-08-04 NOTE — ANESTHESIA POSTPROCEDURE EVALUATION
Patient: Edwin Alex    Procedure: Procedure(s):  ESOPHAGOGASTRODUODENOSCOPY, WITH BIOPSY       Anesthesia Type:  MAC    Note:  Disposition: Outpatient   Postop Pain Control: Uneventful            Sign Out: Well controlled pain   PONV: No   Neuro/Psych: Uneventful            Sign Out: Acceptable/Baseline neuro status   Airway/Respiratory: Uneventful            Sign Out: Acceptable/Baseline resp. status   CV/Hemodynamics: Uneventful            Sign Out: Acceptable CV status; No obvious hypovolemia; No obvious fluid overload   Other NRE: NONE   DID A NON-ROUTINE EVENT OCCUR? No           Last vitals:  Vitals Value Taken Time   /67 08/04/22 1150   Temp 36.1  C (97  F) 08/04/22 1150   Pulse 60 08/04/22 1150   Resp 16 08/04/22 1150   SpO2 97 % 08/04/22 1150       Electronically Signed By: Pasha Bell MD  August 4, 2022  5:00 PM

## 2022-08-04 NOTE — ANESTHESIA PREPROCEDURE EVALUATION
Edwin Alex  [id number]  male  26 year old      Reason for procedure/surgery: egd, dysphagia    Patient Active Problem List   Diagnosis     Esophageal dysphagia     Regurgitation of food     Non-cardiac chest pain       Past Surgical History:  No past surgical history on file.    Past Medical History: No past medical history on file.    Social History:   Social History     Tobacco Use     Smoking status: Never Smoker     Smokeless tobacco: Current User     Types: Chew   Substance Use Topics     Alcohol use: Yes       Family History: No family history on file.    Allergies: No Known Allergies    Active Medications:   Current Outpatient Medications   Medication Sig Dispense Refill     omeprazole (PRILOSEC) 40 MG DR capsule Take 1 capsule (40 mg) by mouth daily 90 capsule 3       Systemic Review:   CONSTITUTIONAL: NEGATIVE for fever, chills, change in weight  ENT/MOUTH: NEGATIVE for ear, mouth and throat problems  RESP: NEGATIVE for significant cough or SOB  CV: NEGATIVE for chest pain, palpitations or peripheral edema    Physical Examination:   Vital Signs: There were no vitals taken for this visit.  GENERAL: healthy, alert and no distress  NECK: no adenopathy, no asymmetry, masses, or scars  RESP: lungs clear to auscultation - no rales, rhonchi or wheezes  CV: regular rate and rhythm, normal S1 S2, no S3 or S4, no murmur, click or rub, no peripheral edema and peripheral pulses strong  ABDOMEN: soft, nontender, no hepatosplenomegaly, no masses and bowel sounds normal  MS: no gross musculoskeletal defects noted, no edema    Plan: Appropriate to proceed as scheduled.      Juan Pablo Rodrigez DO  2022    PCP:  No Ref-Primary, Physician  Anesthesia Pre-Procedure Evaluation    Patient: Edwin Alex   MRN: [id number] : 1995        Procedure : Procedure(s):  ESOPHAGOGASTRODUODENOSCOPY, WITH BIOPSY          No past medical history on file.   History reviewed. No pertinent surgical history.   No Known Allergies    Social History     Tobacco Use     Smoking status: Never Smoker     Smokeless tobacco: Current User     Types: Chew   Substance Use Topics     Alcohol use: Yes      Wt Readings from Last 1 Encounters:   08/04/22 (!) 156.5 kg (345 lb)           Physical Exam    Airway  airway exam normal           Respiratory Devices and Support         Dental  no notable dental history         Cardiovascular   cardiovascular exam normal          Pulmonary   pulmonary exam normal                OUTSIDE LABS:  CBC: No results found for: WBC, HGB, HCT, PLT  BMP: No results found for: NA, POTASSIUM, CHLORIDE, CO2, BUN, CR, GLC  COAGS: No results found for: PTT, INR, FIBR  POC: No results found for: BGM, HCG, HCGS  HEPATIC: No results found for: ALBUMIN, PROTTOTAL, ALT, AST, GGT, ALKPHOS, BILITOTAL, BILIDIRECT, JUSTIN  OTHER: No results found for: PH, LACT, A1C, ABRAHAM, PHOS, MAG, LIPASE, AMYLASE, TSH, T4, T3, CRP, SED    Anesthesia Plan    ASA Status:  3   NPO Status:  NPO Appropriate    Anesthesia Type: MAC.     - Reason for MAC: straight local not clinically adequate   Induction: Intravenous.   Maintenance: TIVA.        Consents    Anesthesia Plan(s) and associated risks, benefits, and realistic alternatives discussed. Questions answered and patient/representative(s) expressed understanding.    - Discussed:     - Discussed with:  Patient         Postoperative Care       PONV prophylaxis: Ondansetron (or other 5HT-3)     Comments:                Pasha Bell MD

## 2022-08-05 LAB
PATH REPORT.COMMENTS IMP SPEC: NORMAL
PATH REPORT.COMMENTS IMP SPEC: NORMAL
PATH REPORT.FINAL DX SPEC: NORMAL
PATH REPORT.GROSS SPEC: NORMAL
PATH REPORT.MICROSCOPIC SPEC OTHER STN: NORMAL
PATH REPORT.RELEVANT HX SPEC: NORMAL
PHOTO IMAGE: NORMAL

## 2022-08-05 PROCEDURE — 88305 TISSUE EXAM BY PATHOLOGIST: CPT | Mod: 26 | Performed by: PATHOLOGY

## 2022-09-14 NOTE — TELEPHONE ENCOUNTER
Follow up EGD 2-4 weeks post last EGD.    Attempted to contact patient regarding upcoming EGD procedure on 9.22.2022 for pre assessment questions. No answer.     Left message to return call to [phone] #4    Discuss at home rapid antigen COVID test 1-2 days prior to procedure.    Arrival time: 0900    Facility location: [facility]    Sedation type: MAC    Indication for procedure: dysphagia, possible dialtion needed, page colmenares mac- 2-4 weeks after first endoscopy    Claudette Shukla RN

## 2022-09-20 NOTE — TELEPHONE ENCOUNTER
2nd attempt    Attempted to contact patient regarding upcoming EGD procedure on 9.22.22 for pre assessment questions. No answer.     Left message to return call to [phone] #4    cVidyahart message sent    Gila Schofield RN

## 2022-09-20 NOTE — TELEPHONE ENCOUNTER
Patient returned call.    Pre assessment questions completed for upcoming EGD procedure scheduled on 9.22.22    Discussed at home rapid antigen COVID test 1-2 days prior to procedure.    Reviewed procedural arrival time 0900 and facility location [facility] [place] [facility].    Designated  policy reviewed. Instructed to have someone stay 24 hours post procedure.     Anticoagulation/blood thinners? no    Electronic implanted devices? no    Reviewed EGD prep instructions with patient.      Patient verbalized understanding and had no questions or concerns at this time.    Gila Schofield RN

## 2022-09-22 ENCOUNTER — HOSPITAL ENCOUNTER (OUTPATIENT)
Facility: AMBULATORY SURGERY CENTER | Age: 27
Discharge: HOME | End: 2022-09-22
Attending: INTERNAL MEDICINE
Payer: COMMERCIAL

## 2022-09-22 ENCOUNTER — ANESTHESIA (OUTPATIENT)
Dept: SURGERY | Facility: AMBULATORY SURGERY CENTER | Age: 27
End: 2022-09-22
Payer: COMMERCIAL

## 2022-09-22 ENCOUNTER — ANESTHESIA EVENT (OUTPATIENT)
Dept: SURGERY | Facility: AMBULATORY SURGERY CENTER | Age: 27
End: 2022-09-22
Payer: COMMERCIAL

## 2022-09-22 VITALS
WEIGHT: 315 LBS | RESPIRATION RATE: 14 BRPM | BODY MASS INDEX: 38.36 KG/M2 | TEMPERATURE: 97.3 F | HEIGHT: 76 IN | DIASTOLIC BLOOD PRESSURE: 80 MMHG | HEART RATE: 65 BPM | SYSTOLIC BLOOD PRESSURE: 123 MMHG | OXYGEN SATURATION: 99 %

## 2022-09-22 VITALS — HEART RATE: 93 BPM

## 2022-09-22 LAB — UPPER GI ENDOSCOPY: NORMAL

## 2022-09-22 PROCEDURE — 43239 EGD BIOPSY SINGLE/MULTIPLE: CPT

## 2022-09-22 PROCEDURE — 88305 TISSUE EXAM BY PATHOLOGIST: CPT | Mod: TC | Performed by: INTERNAL MEDICINE

## 2022-09-22 RX ORDER — ONDANSETRON 2 MG/ML
4 INJECTION INTRAMUSCULAR; INTRAVENOUS
Status: DISCONTINUED | OUTPATIENT
Start: 2022-09-22 | End: 2022-09-22 | Stop reason: HOSPADM

## 2022-09-22 RX ORDER — LIDOCAINE 40 MG/G
CREAM TOPICAL
Status: DISCONTINUED | OUTPATIENT
Start: 2022-09-22 | End: 2022-09-22 | Stop reason: HOSPADM

## 2022-09-22 RX ORDER — FLUMAZENIL 0.1 MG/ML
0.2 INJECTION, SOLUTION INTRAVENOUS
Status: ACTIVE | OUTPATIENT
Start: 2022-09-22 | End: 2022-09-22

## 2022-09-22 RX ORDER — SODIUM CHLORIDE, SODIUM LACTATE, POTASSIUM CHLORIDE, CALCIUM CHLORIDE 600; 310; 30; 20 MG/100ML; MG/100ML; MG/100ML; MG/100ML
INJECTION, SOLUTION INTRAVENOUS CONTINUOUS PRN
Status: DISCONTINUED | OUTPATIENT
Start: 2022-09-22 | End: 2022-09-22

## 2022-09-22 RX ORDER — ONDANSETRON 4 MG/1
4 TABLET, ORALLY DISINTEGRATING ORAL EVERY 6 HOURS PRN
Status: DISCONTINUED | OUTPATIENT
Start: 2022-09-22 | End: 2022-09-23 | Stop reason: HOSPADM

## 2022-09-22 RX ORDER — NALOXONE HYDROCHLORIDE 0.4 MG/ML
0.4 INJECTION, SOLUTION INTRAMUSCULAR; INTRAVENOUS; SUBCUTANEOUS
Status: DISCONTINUED | OUTPATIENT
Start: 2022-09-22 | End: 2022-09-23 | Stop reason: HOSPADM

## 2022-09-22 RX ORDER — PROPOFOL 10 MG/ML
INJECTION, EMULSION INTRAVENOUS PRN
Status: DISCONTINUED | OUTPATIENT
Start: 2022-09-22 | End: 2022-09-22

## 2022-09-22 RX ORDER — SODIUM CHLORIDE, SODIUM LACTATE, POTASSIUM CHLORIDE, CALCIUM CHLORIDE 600; 310; 30; 20 MG/100ML; MG/100ML; MG/100ML; MG/100ML
500 INJECTION, SOLUTION INTRAVENOUS CONTINUOUS
Status: DISCONTINUED | OUTPATIENT
Start: 2022-09-22 | End: 2022-09-22 | Stop reason: HOSPADM

## 2022-09-22 RX ORDER — PROCHLORPERAZINE MALEATE 10 MG
10 TABLET ORAL EVERY 6 HOURS PRN
Status: DISCONTINUED | OUTPATIENT
Start: 2022-09-22 | End: 2022-09-23 | Stop reason: HOSPADM

## 2022-09-22 RX ORDER — PROPOFOL 10 MG/ML
INJECTION, EMULSION INTRAVENOUS CONTINUOUS PRN
Status: DISCONTINUED | OUTPATIENT
Start: 2022-09-22 | End: 2022-09-22

## 2022-09-22 RX ORDER — NALOXONE HYDROCHLORIDE 0.4 MG/ML
0.2 INJECTION, SOLUTION INTRAMUSCULAR; INTRAVENOUS; SUBCUTANEOUS
Status: DISCONTINUED | OUTPATIENT
Start: 2022-09-22 | End: 2022-09-23 | Stop reason: HOSPADM

## 2022-09-22 RX ORDER — KETAMINE HYDROCHLORIDE 10 MG/ML
INJECTION INTRAMUSCULAR; INTRAVENOUS PRN
Status: DISCONTINUED | OUTPATIENT
Start: 2022-09-22 | End: 2022-09-22

## 2022-09-22 RX ORDER — LIDOCAINE HYDROCHLORIDE 20 MG/ML
INJECTION, SOLUTION INFILTRATION; PERINEURAL PRN
Status: DISCONTINUED | OUTPATIENT
Start: 2022-09-22 | End: 2022-09-22

## 2022-09-22 RX ORDER — ONDANSETRON 2 MG/ML
4 INJECTION INTRAMUSCULAR; INTRAVENOUS EVERY 6 HOURS PRN
Status: DISCONTINUED | OUTPATIENT
Start: 2022-09-22 | End: 2022-09-23 | Stop reason: HOSPADM

## 2022-09-22 RX ORDER — GLYCOPYRROLATE 0.2 MG/ML
INJECTION, SOLUTION INTRAMUSCULAR; INTRAVENOUS PRN
Status: DISCONTINUED | OUTPATIENT
Start: 2022-09-22 | End: 2022-09-22

## 2022-09-22 RX ADMIN — SODIUM CHLORIDE, SODIUM LACTATE, POTASSIUM CHLORIDE, CALCIUM CHLORIDE 500 ML: 600; 310; 30; 20 INJECTION, SOLUTION INTRAVENOUS at 09:48

## 2022-09-22 RX ADMIN — PROPOFOL 200 MCG/KG/MIN: 10 INJECTION, EMULSION INTRAVENOUS at 10:28

## 2022-09-22 RX ADMIN — PROPOFOL 100 MG: 10 INJECTION, EMULSION INTRAVENOUS at 10:28

## 2022-09-22 RX ADMIN — GLYCOPYRROLATE 0.2 MG: 0.2 INJECTION, SOLUTION INTRAMUSCULAR; INTRAVENOUS at 10:25

## 2022-09-22 RX ADMIN — SODIUM CHLORIDE, SODIUM LACTATE, POTASSIUM CHLORIDE, CALCIUM CHLORIDE: 600; 310; 30; 20 INJECTION, SOLUTION INTRAVENOUS at 10:25

## 2022-09-22 RX ADMIN — KETAMINE HYDROCHLORIDE 20 MG: 10 INJECTION INTRAMUSCULAR; INTRAVENOUS at 10:28

## 2022-09-22 RX ADMIN — LIDOCAINE HYDROCHLORIDE 100 MG: 20 INJECTION, SOLUTION INFILTRATION; PERINEURAL at 10:28

## 2022-09-22 NOTE — ANESTHESIA CARE TRANSFER NOTE
Patient: Edwin Alex    Procedure: Procedure(s):  ESOPHAGOGASTRODUODENOSCOPY, WITH BIOPSY       Diagnosis: Esophageal dysphagia [R13.19]  Diagnosis Additional Information: No value filed.    Anesthesia Type:   No value filed.     Note:      Level of Consciousness: awake  Oxygen Supplementation: room air    Independent Airway: airway patency satisfactory and stable        Patient transferred to: Phase II    Handoff Report: Identifed the Patient, Identified the Reponsible Provider, Reviewed the pertinent medical history, Discussed the surgical course, Reviewed Intra-OP anesthesia mangement and issues during anesthesia, Set expectations for post-procedure period and Allowed opportunity for questions and acknowledgement of understanding      Vitals:  Vitals Value Taken Time   BP     Temp     Pulse     Resp     SpO2         Electronically Signed By: MARLENE Hopper CRNA  September 22, 2022  10:45 AM

## 2022-09-22 NOTE — ANESTHESIA POSTPROCEDURE EVALUATION
Patient: Edwin Alex    Procedure: Procedure(s):  ESOPHAGOGASTRODUODENOSCOPY, WITH BIOPSY       Anesthesia Type:  MAC    Note:  Disposition: Outpatient   Postop Pain Control: Uneventful            Sign Out: Well controlled pain   PONV: No   Neuro/Psych: Uneventful            Sign Out: Acceptable/Baseline neuro status   Airway/Respiratory: Uneventful            Sign Out: Acceptable/Baseline resp. status   CV/Hemodynamics: Uneventful            Sign Out: Acceptable CV status; No obvious hypovolemia; No obvious fluid overload   Other NRE: NONE   DID A NON-ROUTINE EVENT OCCUR? No           Last vitals:  Vitals Value Taken Time   /80 09/22/22 1111   Temp 36.3  C (97.3  F) 09/22/22 1111   Pulse     Resp 14 09/22/22 1111   SpO2 99 % 09/22/22 1111       Electronically Signed By: Royce Zamora MD  September 22, 2022  12:07 PM

## 2022-09-22 NOTE — ANESTHESIA PREPROCEDURE EVALUATION
Anesthesia Pre-Procedure Evaluation    Patient: Edwin Alex   MRN: [id number] : 1995        Procedure : Procedure(s):  ESOPHAGOGASTRODUODENOSCOPY, WITH BIOPSY          No past medical history on file.   Past Surgical History:   Procedure Laterality Date     ESOPHAGOSCOPY, GASTROSCOPY, DUODENOSCOPY (EGD), COMBINED N/A 2022    Procedure: ESOPHAGOGASTRODUODENOSCOPY, WITH BIOPSY;  Surgeon: Juan Pablo Rodrigez DO;  Location: [facility] [place] OR      No Known Allergies   Social History     Tobacco Use     Smoking status: Never Smoker     Smokeless tobacco: Current User     Types: Chew   Substance Use Topics     Alcohol use: Yes      Wt Readings from Last 1 Encounters:   22 149.7 kg (330 lb)        Anesthesia Evaluation            ROS/MED HX  ENT/Pulmonary:       Neurologic:       Cardiovascular:       METS/Exercise Tolerance:     Hematologic:       Musculoskeletal:       GI/Hepatic: Comment: Dysphagia      Renal/Genitourinary:       Endo:       Psychiatric/Substance Use:       Infectious Disease:       Malignancy:       Other:            Physical Exam    Airway  airway exam normal           Respiratory Devices and Support         Dental  no notable dental history         Cardiovascular   cardiovascular exam normal          Pulmonary   pulmonary exam normal                OUTSIDE LABS:  CBC: No results found for: WBC, HGB, HCT, PLT  BMP: No results found for: NA, POTASSIUM, CHLORIDE, CO2, BUN, CR, GLC  COAGS: No results found for: PTT, INR, FIBR  POC: No results found for: BGM, HCG, HCGS  HEPATIC: No results found for: ALBUMIN, PROTTOTAL, ALT, AST, GGT, ALKPHOS, BILITOTAL, BILIDIRECT, JUSTIN  OTHER: No results found for: PH, LACT, A1C, ABRAHAM, PHOS, MAG, LIPASE, AMYLASE, TSH, T4, T3, CRP, SED    Anesthesia Plan    ASA Status:  2   NPO Status:  NPO Appropriate    Anesthesia Type: MAC.     - Reason for MAC: immobility needed   Induction: Intravenous.   Maintenance: TIVA.        Consents    Anesthesia Plan(s) and associated  risks, benefits, and realistic alternatives discussed. Questions answered and patient/representative(s) expressed understanding.    - Discussed:     - Discussed with:  Patient      - Extended Intubation/Ventilatory Support Discussed: No.      - Patient is DNR/DNI Status: No    Use of blood products discussed: No .     Postoperative Care       PONV prophylaxis: Background Propofol Infusion     Comments:           H&P reviewed: Unable to attach H&P to encounter due to EHR limitations. H&P Update: appropriate H&P reviewed, patient examined. No interval changes since H&P (within 30 days).         Royce Zamora MD

## 2022-09-22 NOTE — H&P
"Edwin FERNANDEZ [facility]  [id number]  male  26 year old      Reason for procedure/surgery: LA grade D esophagitis    Patient Active Problem List   Diagnosis     Esophageal dysphagia     Regurgitation of food     Non-cardiac chest pain       Past Surgical History:    Past Surgical History:   Procedure Laterality Date     ESOPHAGOSCOPY, GASTROSCOPY, DUODENOSCOPY (EGD), COMBINED N/A 8/4/2022    Procedure: ESOPHAGOGASTRODUODENOSCOPY, WITH BIOPSY;  Surgeon: Juan Pablo Rodrigez DO;  Location: [facility] [place] [facility]       Past Medical History: No past medical history on file.    Social History:   Social History     Tobacco Use     Smoking status: Never Smoker     Smokeless tobacco: Current User     Types: Chew   Substance Use Topics     Alcohol use: Yes       Family History: History reviewed. No pertinent family history.    Allergies: No Known Allergies    Active Medications:   Current Outpatient Medications   Medication Sig Dispense Refill     omeprazole (PRILOSEC) 40 MG DR capsule Take 1 capsule (40 mg) by mouth daily 90 capsule 3       Systemic Review:   CONSTITUTIONAL: NEGATIVE for fever, chills, change in weight  ENT/MOUTH: NEGATIVE for ear, mouth and throat problems  RESP: NEGATIVE for significant cough or SOB  CV: NEGATIVE for chest pain, palpitations or peripheral edema    Physical Examination:   Vital Signs: /84 (BP Location: Right arm)   Pulse 65   Temp 97.5  F (36.4  C) (Temporal)   Resp 18   Ht 1.93 m (6' 4\")   Wt 149.7 kg (330 lb)   SpO2 98%   BMI 40.17 kg/m    GENERAL: healthy, alert and no distress  NECK: no adenopathy, no asymmetry, masses, or scars  RESP: lungs clear to auscultation - no rales, rhonchi or wheezes  CV: regular rate and rhythm, normal S1 S2, no S3 or S4, no murmur, click or rub, no peripheral edema and peripheral pulses strong  ABDOMEN: soft, nontender, no hepatosplenomegaly, no masses and bowel sounds normal  MS: no gross musculoskeletal defects noted, no edema    Plan: Appropriate to proceed as " scheduled.      Juan Pablo Rodrigez,   9/22/2022    PCP:  No Ref-Primary, Physician

## 2022-09-23 PROCEDURE — 88342 IMHCHEM/IMCYTCHM 1ST ANTB: CPT | Mod: 26 | Performed by: PATHOLOGY

## 2022-09-23 PROCEDURE — 88305 TISSUE EXAM BY PATHOLOGIST: CPT | Mod: 26 | Performed by: PATHOLOGY

## 2022-09-26 LAB
PATH REPORT.ADDENDUM SPEC: NORMAL
PATH REPORT.COMMENTS IMP SPEC: NORMAL
PATH REPORT.FINAL DX SPEC: NORMAL
PATH REPORT.GROSS SPEC: NORMAL
PATH REPORT.MICROSCOPIC SPEC OTHER STN: NORMAL
PATH REPORT.RELEVANT HX SPEC: NORMAL
PHOTO IMAGE: NORMAL

## 2022-10-03 ENCOUNTER — HEALTH MAINTENANCE LETTER (OUTPATIENT)
Age: 27
End: 2022-10-03

## 2023-01-26 ENCOUNTER — VIRTUAL VISIT (OUTPATIENT)
Dept: GASTROENTEROLOGY | Facility: CLINIC | Age: 28
End: 2023-01-26
Payer: COMMERCIAL

## 2023-01-26 VITALS — WEIGHT: 315 LBS | BODY MASS INDEX: 40.78 KG/M2

## 2023-01-26 DIAGNOSIS — K20.80 LOS ANGELES GRADE D ESOPHAGITIS: Primary | ICD-10-CM

## 2023-01-26 DIAGNOSIS — R13.19 ESOPHAGEAL DYSPHAGIA: ICD-10-CM

## 2023-01-26 DIAGNOSIS — E66.01 MORBID OBESITY (H): ICD-10-CM

## 2023-01-26 PROCEDURE — 99214 OFFICE O/P EST MOD 30 MIN: CPT | Mod: GT | Performed by: INTERNAL MEDICINE

## 2023-01-26 RX ORDER — OMEPRAZOLE 40 MG/1
40 CAPSULE, DELAYED RELEASE ORAL DAILY
Qty: 90 CAPSULE | Refills: 3 | Status: SHIPPED | OUTPATIENT
Start: 2023-01-26 | End: 2024-01-26

## 2023-01-26 ASSESSMENT — PAIN SCALES - GENERAL: PAINLEVEL: NO PAIN (0)

## 2023-01-26 NOTE — LETTER
Date:January 27, 2023      Provider requested that no letter be sent. Do not send.       [facility]

## 2023-01-26 NOTE — PROGRESS NOTES
Edwin is a 27 year old who is being evaluated via a billable video visit.      How would you like to obtain your AVS? MyChart  If the video visit is dropped, the invitation should be resent by: Text to cell phone: [phone]  Will anyone else be joining your video visit? No        Video-Visit Details    Type of service:  Video Visit     Start: 224  End: 241  Originating Location (pt. Location): Home    Distant Location (provider location):  On-site  Platform used for Video Visit: [facility]       Gastroenterology Visit for: Edwin Alex 1995   MRN: [id number]     Reason for Visit:  Dysphagia     Referred by: Self   Patient Care Team:  No Ref-Primary, Physician as PCP - General  Juan Pablo Rodrigez DO as MD (Gastroenterology)  Juan Pablo Rodrigez DO as Assigned Gastroenterology Provider    History of Present Illness:   Edwin Alex is a 27 year old male with prior history of chronic sinusitis and deviated septum s/p septoplasty 6/15/22 who is presenting as a follow up patient in consultation at the request of Dr. Maylin Mcintyre from [facility] in [place] with a chief complaint of reflux and dysphagia with regurgitation.    1/26/23  The patient is moving to [facility]. 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 The patient states that he was not having issues with dysphagia while on omeprazole 40mg daily however now he is having some intermittent dysphagia associated with reflux. This only started in December when he stopped taking the omeprazole. While on omeprazole 40mg daily he was 100% controlled symptomatically.     See BEDQ and Eckardt score below  ---------------------------------------------------------------  7/5/22  Edwin Alex states ongoing issues with dysphagia for the past couple of years with progression of symptoms over the last year. Dysphagia is limited to solid foods such as meats and bread where he feels as though it gets stuck in his chest. Typically  lasts 5-7 minutes before resolving. Occasionally associated with retrosternal pain. Will sometimes attempt to drink fluid to wash it down which can help but has lead to regurgitation with choking and coughing as well. Denies any issues with liquids or soft foods on their own. He also notes a long standing history of acid reflux and occasionally experiences nasal regurgitation. He takes TUMS which helps somewhat but has never tried PPI therapy. Denies any weight loss, voice changes, odynophagia, nausea, vomiting, early satiety, abdominal pain, abdominal distension/bloating, diarrhea, constipation, hematochezia, or melena. No history of radiation exposure. No family history of EOE, esophageal cancer, autoimmune diseases. Denies any history of seasonal allergies or food allergies. Has never had EGD, esophagram or manometry testing previously. Patient does not smoke. He drinks 2-3 alcoholic beverages per week. Uses Zyn pouches but no chewing tobacco products.    ---------------------------------------------------------------      Family history of colon cancer: none  Wt Readings from Last 5 Encounters:   01/26/23 (!) 152 kg (335 lb)   09/22/22 149.7 kg (330 lb)   08/04/22 (!) 156.5 kg (345 lb)   07/05/22 (!) 156.5 kg (345 lb)   06/08/22 (!) 156.5 kg (345 lb)        Esophageal Questionnaire(s)    BEDQ Questionnaire  BEDQ Questionnaire: How Often Have You Had the Following? 7/5/2022 1/25/2023   Trouble eating solid food (meat, bread, vegetables) 4 0   Trouble eating soft foods (yogurt, jello, pudding) 0 0   Trouble swallowing liquids 1 0   Pain while swallowing 3 2   Coughing or choking while swallowing foods or liquids 1 0   Total Score: 9 2     BEDQ Questionnaire: Discomfort/Pain Ratings 7/5/2022 1/25/2023   Eating solid food (meat, bread, vegetables) 4 1   Eating soft foods (yogurt, jello, pudding) 1 0   Drinking liquid 0 0   Total Score: 5 1       Eckardt Questionnaire  Eckardt Questionnaire 7/5/2022 1/25/2023    Dysphagia 2 0   Regurgitation 1 0   Retrosternal Pain 2 1   Weight Loss (kg) 0 0   Total Score:  5 1       Promis 10 Questionnaire  PROMIS 10 FLOWSHEET DATA 1/25/2023   In general, would you say your health is: 3   In general, would you say your quality of life is: 3   In general, how would you rate your physical health? 3   In general, how would you rate your mental health, including your mood and your ability to think? 4   In general, how would you rate your satisfaction with your social activities and relationships? 4   In general, please rate how well you carry out your usual social activities and roles. (This includes activities at home, at work and in your community, and responsibilities as a parent, child, spouse, employee, friend, etc.) 4   To what extent are you able to carry out your everyday physical activities such as walking, climbing stairs, carrying groceries, or moving a chair? 5   In the past 7 days, how often have you been bothered by emotional problems such as feeling anxious, depressed, or irritable? 3   In the past 7 days, how would you rate your fatigue on average? 2   In the past 7 days, how would you rate your pain on average, where 0 means no pain, and 10 means worst imaginable pain? 0   Mental health question re-calculation - no clinical value 3   Physical health question re-calculation - no clinical value 4   Pain question re-calculation - no clinical value 5   Global Mental Health Score 14   Global Physical Health Score 17   PROMIS TOTAL - SUBSCORES 31         STUDIES & PROCEDURES:    EGD:   9/22/22  Impression  The examined esophagus was normal. Esophagitis completely healed. No        stenosis noted. There was the appearance of a hill grade IV and possible        sliding 1cm hernia.        The Z-line was regular and was found 39 cm from the incisors.        A single 5 mm sessile polyp with no bleeding and no stigmata of recent        bleeding was found in the gastric antrum. Biopsies  were taken with a        cold forceps for histology. Verification of patient identification for        the specimen was done. Estimated blood loss: none.        The duodenal bulb, first portion of the duodenum, second portion of the        duodenum and examined duodenum were normal.                                                                                     Impression:            - Normal esophagus.                          - Z-line regular, 39 cm from the incisors.                          - A single gastric polyp. Biopsied.                          - Normal duodenal bulb, first portion of the duodenum,                          second portion of the duodenum and examined duodenum.  Pathology  A. GASTRIC POLYP, BIOPSY:  - Chronic gastritis with minimal activity, foveolar hyperplasia and focal erosions  - Negative for H. pylori-like organisms on routine staining  - Negative for intestinal metaplasia or dysplasia    EGD  Date: 8/4/22  Impression:   The Z-line was regular and was found 38 cm from the incisors.        One benign-appearing, intrinsic moderate (circumferential scarring or        stenosis; an endoscope may pass) stenosis was found 38 cm from the        incisors. The stenosis was traversed.        LA Grade D (one or more mucosal breaks involving at least 75% of        esophageal circumference) esophagitis with no bleeding was found 38 cm        from the incisors.        Normal mucosa was found in the entire esophagus. Eight biopsies were        obtained with cold forceps for evaluation of eosinophilic esophagitis        randomly in the proximal esophagus and in the distal esophagus.        Verification of patient identification for the specimen was done.        Estimated blood loss: none.        The gastroesophageal flap valve was visualized endoscopically and        classified as Hill Grade I (prominent fold, tight to endoscope).        The entire examined stomach was normal.        The duodenal  bulb, first portion of the duodenum, second portion of the        duodenum and examined duodenum were normal.                                                                                    Impression:            - Z-line regular, 38 cm from the incisors.                          - Benign-appearing esophageal stenosis.                          - LA Grade D reflux esophagitis with no bleeding.                          - Normal mucosa was found in the entire esophagus.                          - Gastroesophageal flap valve classified as Hill Grade                          I (prominent fold, tight to endoscope).                          - Normal stomach.                          - Normal duodenal bulb, first portion of the duodenum,                          second portion of the duodenum and examined duodenum.                          - Eight biopsies were obtained in the proximal                          esophagus and in the distal esophagus.   Pathology Report:  A.  ESOPHAGUS, DISTAL, BIOPSY:  - Unremarkable squamous mucosa  - No evidence of eosinophilic esophagitis    B.  ESOPHAGUS, PROXIMAL, BIOPSY:  - Unremarkable squamous mucosa  - No evidence of eosinophilic esophagitis    Colonoscopy:  Date:  Impression:  Pathology Report:     EndoFLIP directed at the UES or LES (8cm (EF-325) balloon length or 16cm (EF-322) balloon length):   Date:  8cm balloon  Balloon inflation Balloon pressure CSA (mm^2) DI (mm^2/mmHg) Dmin (mm) Compliance   20 (ladmark ID)        30        40        50           16cm balloon  Balloon inflation Balloon pressure CSA (mm^2) DI (mm^2/mmHg) Dmin (mm) Compliance   30 (ladmark ID)        40        50        60        70           High Resolution Manometry:  Date:  Impression:    PH/Impedance:  Date:  Impression:     Bravo:  48 or 96hr  Date:  Impression:    CT:  Date:  Impression:    Esophagram:  Date:  Impression:     Prior medical records were reviewed including, but not limited to, notes  from referring providers, lab work, radiographic tests, and other diagnostic tests. Pertinent results were summarized above.     History   No past medical history on file.    Past Surgical History:   Procedure Laterality Date     ESOPHAGOSCOPY, GASTROSCOPY, DUODENOSCOPY (EGD), COMBINED N/A 8/4/2022    Procedure: ESOPHAGOGASTRODUODENOSCOPY, WITH BIOPSY;  Surgeon: Juan Pablo Rodrigez DO;  [place] [facility] [place]     ESOPHAGOSCOPY, GASTROSCOPY, DUODENOSCOPY (EGD), COMBINED N/A 9/22/2022    Procedure: ESOPHAGOGASTRODUODENOSCOPY, WITH BIOPSY;  Surgeon: Juan Pablo Rodrigez DO;  [place] [facility] [place]       Social History     Socioeconomic History     Marital status: Single     Spouse name: Not on file     Number of children: Not on file     Years of education: Not on file     Highest education level: Not on file   Occupational History     Not on file   Tobacco Use     Smoking status: Never     Smokeless tobacco: Current     Types: Chew   Substance and Sexual Activity     Alcohol use: Yes     Drug use: Never     Sexual activity: Not Currently   Other Topics Concern     Not on file   Social History Narrative     Not on file     Social Determinants of Health     Financial Resource Strain: Not on file   Food Insecurity: Not on file   Transportation Needs: Not on file   Physical Activity: Not on file   Stress: Not on file   Social Connections: Not on file   Intimate Partner Violence: Not on file   Housing Stability: Not on file     Patient does not smoke. He drinks 2-3 alcoholic beverages per week. Uses Zyn pouches but no tobacco products. 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     No family history on file.  Family history reviewed and edited as appropriate    Medications and Allergies:     Outpatient Encounter Medications as of 1/26/2023   Medication Sig Dispense Refill     omeprazole (PRILOSEC) 40 MG DR capsule Take 1 capsule (40 mg) by mouth daily 90 capsule 3     omeprazole (PRILOSEC)  40 MG DR capsule Take 1 capsule (40 mg) by mouth daily 90 capsule 3     No facility-administered encounter medications on file as of 1/26/2023.        No Known Allergies     Review of systems:  A full 10 point review of systems was obtained and was negative except for the pertinent positives and negatives stated within the HPI.    Objective Findings:   Physical Exam:    Constitutional: Wt (!) 152 kg (335 lb)   BMI 40.78 kg/m    General: Alert, cooperative, no distress, well-appearing  Head: Atraumatic, normocephalic, no obvious abnormalities   Eyes: EOMI, Sclera anicteric, no obvious conjunctival hemorrhage   Nose:  no obvious malformation, no obvious rhinorrhea   Respiratory: normal appearing respirations, no cough   Musculoskeletal: Range of motion intact, no obvious strength deficit  Skin: No jaundice, no obvious rash  Neurologic: AAOx3, no obvious neurologic abnormality  Psychiatric: Normal Affect, appropriate mood  Extremities: not assessed      Labs, Radiology, Pathology     No results found for: WBC, HGB, PLT, CHOL, TRIG, HDL, LDLDIRECT, ALT, AST, NA, CREATININE, BUN, CO2, TSH, INR, GLUF     Liver Function Studies - No results for input(s): PROTTOTAL, ALBUMIN, BILITOTAL, ALKPHOS, AST, ALT, BILIDIRECT in the last 45942 hours.       Patient Active Problem List    Diagnosis Date Noted     Morbid obesity (H) 01/26/2023     Priority: Medium     [place] grade D esophagitis 01/26/2023     Priority: Medium     Esophageal dysphagia 07/05/2022     Priority: Medium     Regurgitation of food 07/05/2022     Priority: Medium     Non-cardiac chest pain 07/05/2022     Priority: Medium      Assessment and Plan   Assessment:    Edwin Alex is a 27 year old male with prior history of chronic sinusitis and deviated septum s/p septoplasty 6/15/22 who is presenting as a follow up patient with a chief complaint of reflux and dysphagia with regurgitation.    The patient was seen in video telemedicine consultation regarding  symptoms of dysphagia and heartburn.  He was found to have LA grade D erosive esophagitis on his upper endoscopy which was completely healed with PPI omeprazole 40 mg once daily.  Unfortunately, the patient discontinued his omeprazole because he was uncertain if he had refills and has since had recurrence of heartburn and intermittent dysphagia.  He is moving to [facility] for a new job and I have prescribed omeprazole 40 mg once daily to his new local pharmacy.  I have instructed him to continue with this dose for approximately 2 to 3 months.  At that point, he may attempt to decrease to 20 mg once daily.  He was notified that this is the over-the-counter dose.  If he has continued control of his symptoms with that dose he should continue indefinitely.  If, however, he has worsening of symptoms again he should return to the 40 mg prescription dose once daily.  I am happy to refill his medication if he returns to [place] for follow-up visits.  Alternatively, he was provided the name of Dr. Kermit Burns in [facility] who is an esophagotomy just as well and could serve as a local resource if needed.    Plan:  1. Please continue to take omeprazole 40mg daily for at least 2-3 months. At that point you may try to decrease your omeprazole to 20mg daily. You will require PPI based on the erosive esophagitis seen on endoscopy. If your symptoms continue to be controlled on 20mg daily please continue with that dose. If you have a recurrence of symptoms, then increase back to the 40mg daily.   2. If you need an esophagologist in [place] please see Dr. Kermit Burns in [place]      Follow up plan:   Return to clinic  as needed.    The risks and benefits of my recommendations, as well as other treatment options were discussed with the patient and any available family today. All questions were answered.     o Follow up: As planned above. Today, I personally spent 17 minutes in direct face to face time with the  patient, of which greater than 50% of the time was spent in patient education and counseling as described above. Approximately 13 minutes were spent on indirect care associated with the patient's consultation including but not limited to review of: patient medical records to date, clinic visits, hospital records, lab results, imaging studies, procedural documentation, and coordinating care with other providers. The findings from this review are summarized in the above note. All of the above accounted for a cumulative time of 30 minutes and was performed on the date of service.     The patient verbalized understanding of the plan and was appreciative for the time spent and information provided during the office visit.      Documentation assisted by voice recognition and documentation system.    Juan Pablo Rodrigez DO   of Medicine  Director, Esophageal Disorders Program  Division of Gastroenterology, Hepatology, and Nutrition  [facility]

## 2023-01-26 NOTE — PATIENT INSTRUCTIONS
It was a pleasure taking care of you today.  I've included a brief summary of our discussion and care plan from today's visit below.  Please review this information with your primary care provider.  _______________________________________________________________________    My recommendations are summarized as follows:    Please continue to take omeprazole 40mg daily for at least 2-3 months. At that point you may try to decrease your omeprazole to 20mg daily. You will require PPI based on the erosive esophagitis seen on endoscopy. If your symptoms continue to be controlled on 20mg daily please continue with that dose. If you have a recurrence of symptoms, then increase back to the 40mg daily.   If you need an esophagologist in [place] please see Dr. Kermit Burns in [place]    To schedule endoscopic procedures you may call: [phone]  To schedule radiology tests you may call: [phone]  To schedule an ENT appointment you may call: [phone]    Please call my nurse Flora ([phone]), Best ([phone]) with any questions or concerns.  If you were seen through the [facility] please feel free to reach out to Crystal at [phone]   --    Return to GI Clinic as needed   _______________________________________________________________________    Who do I call with any questions after my visit?  Please be in touch if there are any further questions that arise following today's visit.  There are multiple ways to contact your gastroenterology care team.      During business hours, you may reach a Gastroenterology nurse at [phone] and choose option 3.       To schedule or reschedule an appointment, please call [phone].     You can always send a secure message through [employer].  [employer] messages are answered by your nurse or doctor typically within 24 hours.  Please allow extra time on weekends and holidays.      For urgent/emergent questions after business hours, you may reach the on-call GI  Fellow by contacting the [facility]  at [phone].     How will I get the results of any tests ordered?    You will receive all of your results.  If you have signed up for [employer], any tests ordered at your visit will be available to you after your physician reviews them.  Typically this takes 1-2 weeks.  If there are urgent results that require a change in your care plan, your physician or nurse will call you to discuss the next steps.      What is [employer]?  [employer] is a secure way for you to access all of your healthcare records from the [facility].  It is a web based computer program, so you can sign on to it from any location.  It also allows you to send secure messages to your care team.  I recommend signing up for [employer] access if you have not already done so and are comfortable with using a computer.      How to I schedule a follow-up visit?  If you did not schedule a follow-up visit today, please call [phone] to schedule a follow-up office visit.      If you feel you received exceptional care and are interested in supporting the clinical and research goals of Dr. Rodrigez or the Division of Gastroenterology, Hepatology, and Nutrition please contact [email or web address] from the [facility] to discuss opportunities to donate.    Sincerely,    Juan Pablo Rodrigez DO   of Medicine  Director, Esophageal Disorders Program  Division of Gastroenterology, Hepatology, and Nutrition  [facility]

## 2023-01-26 NOTE — LETTER
1/26/2023         RE: Edwin Alex  [address] [place] [address]        Dear Colleague,    Thank you for referring your patient, Edwin Alex, to the [facility] [place]. Please see a copy of my visit note below.    Edwin is a 27 year old who is being evaluated via a billable video visit.      How would you like to obtain your AVS? MyChart  If the video visit is dropped, the invitation should be resent by: Text to cell phone: [phone]  Will anyone else be joining your video visit? No        Video-Visit Details    Type of service:  Video Visit     Start: 224  End: 241  Originating Location (pt. Location): Home    Distant Location (provider location):  On-site  Platform used for Video Visit: [facility]       Gastroenterology Visit for: Edwin Alex 1995   MRN: [id number]     Reason for Visit:  Dysphagia     Referred by: Self   Patient Care Team:  No Ref-Primary, Physician as PCP - General  Juan Pablo Rodrigez DO as MD (Gastroenterology)  Juan Pablo Rodrigez DO as Assigned Gastroenterology Provider    History of Present Illness:   Edwin Alex is a 27 year old male with prior history of chronic sinusitis and deviated septum s/p septoplasty 6/15/22 who is presenting as a follow up patient in consultation at the request of Dr. Maylin Mcintyre from [facility] in [place] with a chief complaint of reflux and dysphagia with regurgitation.    1/26/23  The patient is moving to [facility]. 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 The patient states that he was not having issues with dysphagia while on omeprazole 40mg daily however now he is having some intermittent dysphagia associated with reflux. This only started in December when he stopped taking the omeprazole. While on omeprazole 40mg daily he was 100% controlled symptomatically.     See BEDQ and Eckardt score  below  ---------------------------------------------------------------  7/5/22  Edwin Alex states ongoing issues with dysphagia for the past couple of years with progression of symptoms over the last year. Dysphagia is limited to solid foods such as meats and bread where he feels as though it gets stuck in his chest. Typically lasts 5-7 minutes before resolving. Occasionally associated with retrosternal pain. Will sometimes attempt to drink fluid to wash it down which can help but has lead to regurgitation with choking and coughing as well. Denies any issues with liquids or soft foods on their own. He also notes a long standing history of acid reflux and occasionally experiences nasal regurgitation. He takes TUMS which helps somewhat but has never tried PPI therapy. Denies any weight loss, voice changes, odynophagia, nausea, vomiting, early satiety, abdominal pain, abdominal distension/bloating, diarrhea, constipation, hematochezia, or melena. No history of radiation exposure. No family history of EOE, esophageal cancer, autoimmune diseases. Denies any history of seasonal allergies or food allergies. Has never had EGD, esophagram or manometry testing previously. Patient does not smoke. He drinks 2-3 alcoholic beverages per week. Uses Zyn pouches but no chewing tobacco products.    ---------------------------------------------------------------      Family history of colon cancer: none  Wt Readings from Last 5 Encounters:   01/26/23 (!) 152 kg (335 lb)   09/22/22 149.7 kg (330 lb)   08/04/22 (!) 156.5 kg (345 lb)   07/05/22 (!) 156.5 kg (345 lb)   06/08/22 (!) 156.5 kg (345 lb)        Esophageal Questionnaire(s)    BEDQ Questionnaire  BEDQ Questionnaire: How Often Have You Had the Following? 7/5/2022 1/25/2023   Trouble eating solid food (meat, bread, vegetables) 4 0   Trouble eating soft foods (yogurt, jello, pudding) 0 0   Trouble swallowing liquids 1 0   Pain while swallowing 3 2   Coughing or choking while  swallowing foods or liquids 1 0   Total Score: 9 2     BEDQ Questionnaire: Discomfort/Pain Ratings 7/5/2022 1/25/2023   Eating solid food (meat, bread, vegetables) 4 1   Eating soft foods (yogurt, jello, pudding) 1 0   Drinking liquid 0 0   Total Score: 5 1       Eckardt Questionnaire  Eckardt Questionnaire 7/5/2022 1/25/2023   Dysphagia 2 0   Regurgitation 1 0   Retrosternal Pain 2 1   Weight Loss (kg) 0 0   Total Score:  5 1       Promis 10 Questionnaire  PROMIS 10 FLOWSHEET DATA 1/25/2023   In general, would you say your health is: 3   In general, would you say your quality of life is: 3   In general, how would you rate your physical health? 3   In general, how would you rate your mental health, including your mood and your ability to think? 4   In general, how would you rate your satisfaction with your social activities and relationships? 4   In general, please rate how well you carry out your usual social activities and roles. (This includes activities at home, at work and in your community, and responsibilities as a parent, child, spouse, employee, friend, etc.) 4   To what extent are you able to carry out your everyday physical activities such as walking, climbing stairs, carrying groceries, or moving a chair? 5   In the past 7 days, how often have you been bothered by emotional problems such as feeling anxious, depressed, or irritable? 3   In the past 7 days, how would you rate your fatigue on average? 2   In the past 7 days, how would you rate your pain on average, where 0 means no pain, and 10 means worst imaginable pain? 0   Mental health question re-calculation - no clinical value 3   Physical health question re-calculation - no clinical value 4   Pain question re-calculation - no clinical value 5   Global Mental Health Score 14   Global Physical Health Score 17   PROMIS TOTAL - SUBSCORES 31         STUDIES & PROCEDURES:    EGD:   9/22/22  Impression  The examined esophagus was normal. Esophagitis  completely healed. No        stenosis noted. There was the appearance of a hill grade IV and possible        sliding 1cm hernia.        The Z-line was regular and was found 39 cm from the incisors.        A single 5 mm sessile polyp with no bleeding and no stigmata of recent        bleeding was found in the gastric antrum. Biopsies were taken with a        cold forceps for histology. Verification of patient identification for        the specimen was done. Estimated blood loss: none.        The duodenal bulb, first portion of the duodenum, second portion of the        duodenum and examined duodenum were normal.                                                                                     Impression:            - Normal esophagus.                          - Z-line regular, 39 cm from the incisors.                          - A single gastric polyp. Biopsied.                          - Normal duodenal bulb, first portion of the duodenum,                          second portion of the duodenum and examined duodenum.  Pathology  A. GASTRIC POLYP, BIOPSY:  - Chronic gastritis with minimal activity, foveolar hyperplasia and focal erosions  - Negative for H. pylori-like organisms on routine staining  - Negative for intestinal metaplasia or dysplasia    EGD  Date: 8/4/22  Impression:   The Z-line was regular and was found 38 cm from the incisors.        One benign-appearing, intrinsic moderate (circumferential scarring or        stenosis; an endoscope may pass) stenosis was found 38 cm from the        incisors. The stenosis was traversed.        LA Grade D (one or more mucosal breaks involving at least 75% of        esophageal circumference) esophagitis with no bleeding was found 38 cm        from the incisors.        Normal mucosa was found in the entire esophagus. Eight biopsies were        obtained with cold forceps for evaluation of eosinophilic esophagitis        randomly in the proximal esophagus and in the distal  esophagus.        Verification of patient identification for the specimen was done.        Estimated blood loss: none.        The gastroesophageal flap valve was visualized endoscopically and        classified as Hill Grade I (prominent fold, tight to endoscope).        The entire examined stomach was normal.        The duodenal bulb, first portion of the duodenum, second portion of the        duodenum and examined duodenum were normal.                                                                                    Impression:            - Z-line regular, 38 cm from the incisors.                          - Benign-appearing esophageal stenosis.                          - LA Grade D reflux esophagitis with no bleeding.                          - Normal mucosa was found in the entire esophagus.                          - Gastroesophageal flap valve classified as Hill Grade                          I (prominent fold, tight to endoscope).                          - Normal stomach.                          - Normal duodenal bulb, first portion of the duodenum,                          second portion of the duodenum and examined duodenum.                          - Eight biopsies were obtained in the proximal                          esophagus and in the distal esophagus.   Pathology Report:  A.  ESOPHAGUS, DISTAL, BIOPSY:  - Unremarkable squamous mucosa  - No evidence of eosinophilic esophagitis    B.  ESOPHAGUS, PROXIMAL, BIOPSY:  - Unremarkable squamous mucosa  - No evidence of eosinophilic esophagitis    Colonoscopy:  Date:  Impression:  Pathology Report:     EndoFLIP directed at the UES or LES (8cm (EF-325) balloon length or 16cm (EF-322) balloon length):   Date:  8cm balloon  Balloon inflation Balloon pressure CSA (mm^2) DI (mm^2/mmHg) Dmin (mm) Compliance   20 (ladmark ID)        30        40        50           16cm balloon  Balloon inflation Balloon pressure CSA (mm^2) DI (mm^2/mmHg) Dmin (mm) Compliance   30  (ladmark ID)        40        50        60        70           High Resolution Manometry:  Date:  Impression:    PH/Impedance:  Date:  Impression:     Bravo:  48 or 96hr  Date:  Impression:    CT:  Date:  Impression:    Esophagram:  Date:  Impression:     Prior medical records were reviewed including, but not limited to, notes from referring providers, lab work, radiographic tests, and other diagnostic tests. Pertinent results were summarized above.     History   No past medical history on file.    Past Surgical History:   Procedure Laterality Date     ESOPHAGOSCOPY, GASTROSCOPY, DUODENOSCOPY (EGD), COMBINED N/A 8/4/2022    Procedure: ESOPHAGOGASTRODUODENOSCOPY, WITH BIOPSY;  Surgeon: Juan Pablo Rodrigez DO;  Location: [facility]     ESOPHAGOSCOPY, GASTROSCOPY, DUODENOSCOPY (EGD), COMBINED N/A 9/22/2022    Procedure: ESOPHAGOGASTRODUODENOSCOPY, WITH BIOPSY;  Surgeon: Juan Pablo Rodrigez DO;  Location: [facility] OR       Social History     Socioeconomic History     Marital status: Single     Spouse name: Not on file     Number of children: Not on file     Years of education: Not on file     Highest education level: Not on file   Occupational History     Not on file   Tobacco Use     Smoking status: Never     Smokeless tobacco: Current     Types: Chew   Substance and Sexual Activity     Alcohol use: Yes     Drug use: Never     Sexual activity: Not Currently   Other Topics Concern     Not on file   Social History Narrative     Not on file     Social Determinants of Health     Financial Resource Strain: Not on file   Food Insecurity: Not on file   Transportation Needs: Not on file   Physical Activity: Not on file   Stress: Not on file   Social Connections: Not on file   Intimate Partner Violence: Not on file   Housing Stability: Not on file     Patient does not smoke. He drinks 2-3 alcoholic beverages per week. Uses Zyn pouches but no tobacco products. 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     No family history on file.  Family history reviewed and edited as appropriate    Medications and Allergies:     Outpatient Encounter Medications as of 1/26/2023   Medication Sig Dispense Refill     omeprazole (PRILOSEC) 40 MG DR capsule Take 1 capsule (40 mg) by mouth daily 90 capsule 3     omeprazole (PRILOSEC) 40 MG DR capsule Take 1 capsule (40 mg) by mouth daily 90 capsule 3     No facility-administered encounter medications on file as of 1/26/2023.        No Known Allergies     Review of systems:  A full 10 point review of systems was obtained and was negative except for the pertinent positives and negatives stated within the HPI.    Objective Findings:   Physical Exam:    Constitutional: Wt (!) 152 kg (335 lb)   BMI 40.78 kg/m    General: Alert, cooperative, no distress, well-appearing  Head: Atraumatic, normocephalic, no obvious abnormalities   Eyes: EOMI, Sclera anicteric, no obvious conjunctival hemorrhage   Nose:  no obvious malformation, no obvious rhinorrhea   Respiratory: normal appearing respirations, no cough   Musculoskeletal: Range of motion intact, no obvious strength deficit  Skin: No jaundice, no obvious rash  Neurologic: AAOx3, no obvious neurologic abnormality  Psychiatric: Normal Affect, appropriate mood  Extremities: not assessed      Labs, Radiology, Pathology     No results found for: WBC, HGB, PLT, CHOL, TRIG, HDL, LDLDIRECT, ALT, AST, NA, CREATININE, BUN, CO2, TSH, INR, GLUF     Liver Function Studies - No results for input(s): PROTTOTAL, ALBUMIN, BILITOTAL, ALKPHOS, AST, ALT, BILIDIRECT in the last 14968 hours.       Patient Active Problem List    Diagnosis Date Noted     Morbid obesity (H) 01/26/2023     Priority: Medium     Onslow grade D esophagitis 01/26/2023     Priority: Medium     Esophageal dysphagia 07/05/2022     Priority: Medium     Regurgitation of food 07/05/2022     Priority: Medium     Non-cardiac chest pain 07/05/2022     Priority: Medium       Assessment and Plan   Assessment:    Edwin Alex is a 27 year old male with prior history of chronic sinusitis and deviated septum s/p septoplasty 6/15/22 who is presenting as a follow up patient with a chief complaint of reflux and dysphagia with regurgitation.    The patient was seen in video telemedicine consultation regarding symptoms of dysphagia and heartburn.  He was found to have LA grade D erosive esophagitis on his upper endoscopy which was completely healed with PPI omeprazole 40 mg once daily.  Unfortunately, the patient discontinued his omeprazole because he was uncertain if he had refills and has since had recurrence of heartburn and intermittent dysphagia.  He is moving to [facility] for a new job and I have prescribed omeprazole 40 mg once daily to his new local pharmacy.  I have instructed him to continue with this dose for approximately 2 to 3 months.  At that point, he may attempt to decrease to 20 mg once daily.  He was notified that this is the over-the-counter dose.  If he has continued control of his symptoms with that dose he should continue indefinitely.  If, however, he has worsening of symptoms again he should return to the 40 mg prescription dose once daily.  I am happy to refill his medication if he returns to [place] for follow-up visits.  Alternatively, he was provided the name of Dr. Kermit Burns in [facility] who is an esophagotomy just as well and could serve as a local resource if needed.    Plan:  1. Please continue to take omeprazole 40mg daily for at least 2-3 months. At that point you may try to decrease your omeprazole to 20mg daily. You will require PPI based on the erosive esophagitis seen on endoscopy. If your symptoms continue to be controlled on 20mg daily please continue with that dose. If you have a recurrence of symptoms, then increase back to the 40mg daily.   2. If you need an esophagologist in [place] please see Dr. Kerimt Burns in  [place]      Follow up plan:   Return to clinic  as needed.    The risks and benefits of my recommendations, as well as other treatment options were discussed with the patient and any available family today. All questions were answered.     o Follow up: As planned above. Today, I personally spent 17 minutes in direct face to face time with the patient, of which greater than 50% of the time was spent in patient education and counseling as described above. Approximately 13 minutes were spent on indirect care associated with the patient's consultation including but not limited to review of: patient medical records to date, clinic visits, hospital records, lab results, imaging studies, procedural documentation, and coordinating care with other providers. The findings from this review are summarized in the above note. All of the above accounted for a cumulative time of 30 minutes and was performed on the date of service.     The patient verbalized understanding of the plan and was appreciative for the time spent and information provided during the office visit.      Documentation assisted by voice recognition and documentation system.    Juan Pablo Rodrigez DO   of Medicine  Director, Esophageal Disorders Program  Division of Gastroenterology, Hepatology, and Nutrition  [facility]          Again, thank you for allowing me to participate in the care of your patient.        Sincerely,        Juan Pablo Rodrigez DO

## 2023-08-13 ENCOUNTER — HEALTH MAINTENANCE LETTER (OUTPATIENT)
Age: 28
End: 2023-08-13

## 2024-10-06 ENCOUNTER — HEALTH MAINTENANCE LETTER (OUTPATIENT)
Age: 29
End: 2024-10-06

## (undated) DEVICE — SUCTION CATH AIRLIFE TRI-FLO W/CONTROL PORT 14FR  T60C

## (undated) DEVICE — SOL WATER IRRIG 500ML BOTTLE 2F7113

## (undated) DEVICE — KIT ENDO TURNOVER/PROCEDURE CARRY-ON 101822

## (undated) DEVICE — GLOVE EXAM NITRILE LG PF LATEX FREE 5064

## (undated) DEVICE — ENDO BITE BLOCK ADULT OMNI-BLOC

## (undated) DEVICE — SYR 30ML SLIP TIP W/O NDL 302833

## (undated) DEVICE — TUBING SUCTION 12"X1/4" N612

## (undated) DEVICE — SUCTION MANIFOLD NEPTUNE 2 SYS 1 PORT 702-025-000

## (undated) DEVICE — GOWN IMPERVIOUS 2XL BLUE

## (undated) DEVICE — SPECIMEN CONTAINER 3OZ W/FORMALIN 59901

## (undated) DEVICE — ENDO FORCEP BX CAPTURA PRO SPIKE G50696